# Patient Record
Sex: FEMALE | Race: ASIAN | NOT HISPANIC OR LATINO
[De-identification: names, ages, dates, MRNs, and addresses within clinical notes are randomized per-mention and may not be internally consistent; named-entity substitution may affect disease eponyms.]

---

## 2019-08-22 ENCOUNTER — RESULT REVIEW (OUTPATIENT)
Age: 34
End: 2019-08-22

## 2020-07-31 ENCOUNTER — OUTPATIENT (OUTPATIENT)
Dept: OUTPATIENT SERVICES | Facility: HOSPITAL | Age: 35
LOS: 1 days | Discharge: HOME | End: 2020-07-31
Payer: COMMERCIAL

## 2020-07-31 VITALS
DIASTOLIC BLOOD PRESSURE: 70 MMHG | TEMPERATURE: 99 F | RESPIRATION RATE: 16 BRPM | SYSTOLIC BLOOD PRESSURE: 119 MMHG | HEART RATE: 99 BPM

## 2020-07-31 VITALS — SYSTOLIC BLOOD PRESSURE: 119 MMHG | DIASTOLIC BLOOD PRESSURE: 70 MMHG | HEART RATE: 99 BPM

## 2020-07-31 DIAGNOSIS — Z3A.00 WEEKS OF GESTATION OF PREGNANCY NOT SPECIFIED: ICD-10-CM

## 2020-07-31 DIAGNOSIS — O26.899 OTHER SPECIFIED PREGNANCY RELATED CONDITIONS, UNSPECIFIED TRIMESTER: ICD-10-CM

## 2020-07-31 LAB

## 2020-07-31 PROCEDURE — 99214 OFFICE O/P EST MOD 30 MIN: CPT | Mod: 25

## 2020-07-31 PROCEDURE — 76830 TRANSVAGINAL US NON-OB: CPT | Mod: 26

## 2020-07-31 PROCEDURE — 59025 FETAL NON-STRESS TEST: CPT | Mod: 26

## 2020-07-31 NOTE — OB PROVIDER TRIAGE NOTE - NSHPLABSRESULTS_GEN_ALL_CORE
Urinalysis Basic - ( 2020 16:20 )    Color: LIGHT YELLOW / Appearance: CLEAR / S.008 / pH: 6.5  Gluc: NEGATIVE / Ketone: NEGATIVE  / Bili: NEGATIVE / Urobili: NORMAL   Blood: NEGATIVE / Protein: NEGATIVE / Nitrite: NEGATIVE   Leuk Esterase: NEGATIVE / RBC: x / WBC x   Sq Epi: x / Non Sq Epi: x / Bacteria: x

## 2020-07-31 NOTE — OB PROVIDER TRIAGE NOTE - NSOBPROVIDERNOTE_OBGYN_ALL_OB_FT
34 yo  female  , at 28.6 wks GA with no evidence of PTL or acute pathology at this time  - Muscular skeletal pain in pregnancy (Round Ligament Pain)   - DW DR Rios  - DC Home with detailed precautions and OB F/U as scheduled

## 2020-07-31 NOTE — OB PROVIDER TRIAGE NOTE - NSHPPHYSICALEXAM_GEN_ALL_CORE
A/O x 3  NAD  ICU Vital Signs Last 24 Hrs  T(C): 37 (31 Jul 2020 15:39), Max: 37 (31 Jul 2020 15:39)  T(F): 98.6 (31 Jul 2020 15:39), Max: 98.6 (31 Jul 2020 15:39)  HR: 99 (31 Jul 2020 15:49) (99 - 99)  BP: 119/70 (31 Jul 2020 15:49) (119/70 - 119/70)  BP(mean): --  ABP: --  ABP(mean): --  RR: 16 (31 Jul 2020 15:39) (16 - 16)  SpO2: --  Abdomen is soft NT and gravid with no rebound, no guarding, no rigidity on palpation  SSE: No Pooling, cervix appears visually closed  TVS = 3.7 CM with no dynamic change  FHR: Cat 1 for GA (150 baseline, mod variability, accels, no decels)  TOCO: No CTX

## 2020-10-11 ENCOUNTER — OUTPATIENT (OUTPATIENT)
Dept: INPATIENT UNIT | Facility: HOSPITAL | Age: 35
LOS: 1 days | Discharge: HOME | End: 2020-10-11
Payer: COMMERCIAL

## 2020-10-11 VITALS
HEART RATE: 82 BPM | SYSTOLIC BLOOD PRESSURE: 118 MMHG | TEMPERATURE: 98 F | RESPIRATION RATE: 18 BRPM | DIASTOLIC BLOOD PRESSURE: 75 MMHG

## 2020-10-11 VITALS — HEART RATE: 80 BPM | DIASTOLIC BLOOD PRESSURE: 68 MMHG | SYSTOLIC BLOOD PRESSURE: 119 MMHG

## 2020-10-11 DIAGNOSIS — Z3A.00 WEEKS OF GESTATION OF PREGNANCY NOT SPECIFIED: ICD-10-CM

## 2020-10-11 DIAGNOSIS — O26.899 OTHER SPECIFIED PREGNANCY RELATED CONDITIONS, UNSPECIFIED TRIMESTER: ICD-10-CM

## 2020-10-11 PROCEDURE — 76815 OB US LIMITED FETUS(S): CPT | Mod: 26

## 2020-10-11 PROCEDURE — 99213 OFFICE O/P EST LOW 20 MIN: CPT | Mod: 25

## 2020-10-11 NOTE — OB PROVIDER TRIAGE NOTE - HISTORY OF PRESENT ILLNESS
34yo  @ 39.1 presents with c/o decreased FM since last night. Also reports lower abdominal pressure since 0200. Denies LOF, VB, ctx, urinary symptoms.   Denies exposure to or infection from from COVID-19    Denies PMH/PSH    AP course complicated by:   hypothyroidism with pregnancy- Levothyroxine 25mcg QD

## 2020-10-11 NOTE — OB PROVIDER TRIAGE NOTE - NSOBPROVIDERNOTE_OBGYN_ALL_OB_FT
FH on sono @ 0900 - 135 bpm Plan D/W Dr. Rios, normal fetal testing.   Fetal kick counts reviewed.  Follow up this week as scheduled.    FH on sono @ 0900 - 135 bpm

## 2020-10-11 NOTE — OB PROVIDER TRIAGE NOTE - NSHPPHYSICALEXAM_GEN_ALL_CORE
Assessment reveals VSS, abdomen soft, NT, gravid.    For NST and BPP Assessment reveals VSS, abdomen soft, NT, gravid.    For NST and BPP    Category 1 FHT, occasional ctx on toco  VE 0.5/50/-3  BPP 8/8, KENDALL 9.8, vtx, anterior placenta

## 2020-10-11 NOTE — OB PROVIDER TRIAGE NOTE - NS_DISPOSITION_OBGYN_ALL_OB
Continue to Observe You can access the FollowMyHealth Patient Portal offered by [facility] by registering at the following website: [email or web address]. By joining [facility]’s FollowMyHealth portal, you will also be able to view your health information using other applications (apps) compatible with our system. Discharge

## 2020-10-19 ENCOUNTER — TRANSCRIPTION ENCOUNTER (OUTPATIENT)
Age: 35
End: 2020-10-19

## 2020-10-19 ENCOUNTER — INPATIENT (INPATIENT)
Facility: HOSPITAL | Age: 35
LOS: 3 days | Discharge: HOME | End: 2020-10-23
Attending: SPECIALIST | Admitting: SPECIALIST

## 2020-10-19 VITALS
DIASTOLIC BLOOD PRESSURE: 67 MMHG | HEART RATE: 100 BPM | RESPIRATION RATE: 17 BRPM | SYSTOLIC BLOOD PRESSURE: 137 MMHG | TEMPERATURE: 98 F

## 2020-10-19 DIAGNOSIS — O36.8130 DECREASED FETAL MOVEMENTS, THIRD TRIMESTER, NOT APPLICABLE OR UNSPECIFIED: ICD-10-CM

## 2020-10-19 DIAGNOSIS — O26.899 OTHER SPECIFIED PREGNANCY RELATED CONDITIONS, UNSPECIFIED TRIMESTER: ICD-10-CM

## 2020-10-19 DIAGNOSIS — Z3A.00 WEEKS OF GESTATION OF PREGNANCY NOT SPECIFIED: ICD-10-CM

## 2020-10-19 LAB
APPEARANCE UR: CLEAR — SIGNIFICANT CHANGE UP
BASOPHILS # BLD AUTO: 0.06 K/UL — SIGNIFICANT CHANGE UP (ref 0–0.2)
BASOPHILS NFR BLD AUTO: 0.6 % — SIGNIFICANT CHANGE UP (ref 0–2)
BILIRUB UR-MCNC: NEGATIVE — SIGNIFICANT CHANGE UP
BLOOD UR QL VISUAL: NEGATIVE — SIGNIFICANT CHANGE UP
COLOR SPEC: SIGNIFICANT CHANGE UP
EOSINOPHIL # BLD AUTO: 0.1 K/UL — SIGNIFICANT CHANGE UP (ref 0–0.5)
EOSINOPHIL NFR BLD AUTO: 1.1 % — SIGNIFICANT CHANGE UP (ref 0–6)
GLUCOSE UR-MCNC: NEGATIVE — SIGNIFICANT CHANGE UP
HCT VFR BLD CALC: 42.8 % — SIGNIFICANT CHANGE UP (ref 34.5–45)
HGB BLD-MCNC: 13.7 G/DL — SIGNIFICANT CHANGE UP (ref 11.5–15.5)
IMM GRANULOCYTES NFR BLD AUTO: 0.7 % — SIGNIFICANT CHANGE UP (ref 0–1.5)
KETONES UR-MCNC: NEGATIVE — SIGNIFICANT CHANGE UP
LEUKOCYTE ESTERASE UR-ACNC: NEGATIVE — SIGNIFICANT CHANGE UP
LYMPHOCYTES # BLD AUTO: 3.65 K/UL — HIGH (ref 1–3.3)
LYMPHOCYTES # BLD AUTO: 38.4 % — SIGNIFICANT CHANGE UP (ref 13–44)
MCHC RBC-ENTMCNC: 27.1 PG — SIGNIFICANT CHANGE UP (ref 27–34)
MCHC RBC-ENTMCNC: 32 % — SIGNIFICANT CHANGE UP (ref 32–36)
MCV RBC AUTO: 84.6 FL — SIGNIFICANT CHANGE UP (ref 80–100)
MONOCYTES # BLD AUTO: 0.45 K/UL — SIGNIFICANT CHANGE UP (ref 0–0.9)
MONOCYTES NFR BLD AUTO: 4.7 % — SIGNIFICANT CHANGE UP (ref 2–14)
NEUTROPHILS # BLD AUTO: 5.18 K/UL — SIGNIFICANT CHANGE UP (ref 1.8–7.4)
NEUTROPHILS NFR BLD AUTO: 54.5 % — SIGNIFICANT CHANGE UP (ref 43–77)
NITRITE UR-MCNC: NEGATIVE — SIGNIFICANT CHANGE UP
NRBC # FLD: 0 K/UL — SIGNIFICANT CHANGE UP (ref 0–0)
PH UR: 6 — SIGNIFICANT CHANGE UP (ref 5–8)
PLATELET # BLD AUTO: 209 K/UL — SIGNIFICANT CHANGE UP (ref 150–400)
PMV BLD: 12.5 FL — SIGNIFICANT CHANGE UP (ref 7–13)
PROT UR-MCNC: 10 — SIGNIFICANT CHANGE UP
RBC # BLD: 5.06 M/UL — SIGNIFICANT CHANGE UP (ref 3.8–5.2)
RBC # FLD: 17.9 % — HIGH (ref 10.3–14.5)
SP GR SPEC: 1.01 — SIGNIFICANT CHANGE UP (ref 1–1.04)
UROBILINOGEN FLD QL: NORMAL — SIGNIFICANT CHANGE UP
WBC # BLD: 9.51 K/UL — SIGNIFICANT CHANGE UP (ref 3.8–10.5)
WBC # FLD AUTO: 9.51 K/UL — SIGNIFICANT CHANGE UP (ref 3.8–10.5)

## 2020-10-19 RX ORDER — OXYTOCIN 10 UNIT/ML
333.33 VIAL (ML) INJECTION
Qty: 20 | Refills: 0 | Status: DISCONTINUED | OUTPATIENT
Start: 2020-10-19 | End: 2020-10-20

## 2020-10-19 RX ORDER — SODIUM CHLORIDE 9 MG/ML
1000 INJECTION, SOLUTION INTRAVENOUS
Refills: 0 | Status: DISCONTINUED | OUTPATIENT
Start: 2020-10-19 | End: 2020-10-20

## 2020-10-19 RX ORDER — SODIUM CHLORIDE 9 MG/ML
1000 INJECTION, SOLUTION INTRAVENOUS ONCE
Refills: 0 | Status: COMPLETED | OUTPATIENT
Start: 2020-10-19 | End: 2020-10-20

## 2020-10-19 RX ORDER — LEVOTHYROXINE SODIUM 125 MCG
1 TABLET ORAL
Qty: 0 | Refills: 0 | DISCHARGE

## 2020-10-19 NOTE — OB PROVIDER TRIAGE NOTE - NSOBPROVIDERNOTE_OBGYN_ALL_OB_FT
maternal fetal status reassuring     d/w Dr. Vail     -cleared for discharge home   -keep scheduled appt   -fetal kick count reviewed   -labor precautions reviewed maternal fetal status reassuring but patient reports decreased fetal movement     d/w Dr. Vail    -Admit to L&D for IOL with PO cytotec   -routine orders placed   -covid swab sent

## 2020-10-19 NOTE — OB PROVIDER H&P - NSHPPHYSICALEXAM_GEN_ALL_CORE
Vital Signs Last 24 Hrs  T(C): 36.9 (19 Oct 2020 20:02), Max: 36.9 (19 Oct 2020 20:02)  T(F): 98.4 (19 Oct 2020 20:02), Max: 98.4 (19 Oct 2020 20:02)  HR: 100 (19 Oct 2020 20:02) (100 - 100)  BP: 137/67 (19 Oct 2020 20:02) (137/67 - 137/67)  RR: 17 (19 Oct 2020 20:02) (17 - 17)    Abdomen soft, nontender     Category 1 tracing reviewed by Dr. Doe   No contractions by toco     TAUS Cephalic presentation, anterior placenta, KENDALL 10.11 cm. bpp 10/10    SVE 0.5/60/-3    clinical efw 3500 g

## 2020-10-19 NOTE — OB PROVIDER H&P - ASSESSMENT
maternal fetal status reassuring but patient reports decreased fetal movement     d/w Dr. Vail    -Admit to L&D for IOL with PO cytotec   -routine orders placed   -covid swab sent

## 2020-10-19 NOTE — OB PROVIDER TRIAGE NOTE - ADDITIONAL INSTRUCTIONS
-cleared for discharge home   -keep scheduled appt   -fetal kick count reviewed   -labor precautions reviewed

## 2020-10-19 NOTE — OB PROVIDER TRIAGE NOTE - NSHPPHYSICALEXAM_GEN_ALL_CORE
Vital Signs Last 24 Hrs  T(C): 36.9 (19 Oct 2020 20:02), Max: 36.9 (19 Oct 2020 20:02)  T(F): 98.4 (19 Oct 2020 20:02), Max: 98.4 (19 Oct 2020 20:02)  HR: 100 (19 Oct 2020 20:02) (100 - 100)  BP: 137/67 (19 Oct 2020 20:02) (137/67 - 137/67)  RR: 17 (19 Oct 2020 20:02) (17 - 17)    Abdomen soft, nontender     Category 1 tracing reviewed by Dr. Doe   No contractions by toco     TAUS Cephalic presentation, anterior placenta, KENDALL 10.11 cm. bpp 10/10 Vital Signs Last 24 Hrs  T(C): 36.9 (19 Oct 2020 20:02), Max: 36.9 (19 Oct 2020 20:02)  T(F): 98.4 (19 Oct 2020 20:02), Max: 98.4 (19 Oct 2020 20:02)  HR: 100 (19 Oct 2020 20:02) (100 - 100)  BP: 137/67 (19 Oct 2020 20:02) (137/67 - 137/67)  RR: 17 (19 Oct 2020 20:02) (17 - 17)    Abdomen soft, nontender     Category 1 tracing reviewed by Dr. Doe   No contractions by toco     TAUS Cephalic presentation, anterior placenta, KENDALL 10.11 cm. bpp 10/10    SVE 0.5/60/-3    clinical efw 3500 g

## 2020-10-19 NOTE — OB PROVIDER TRIAGE NOTE - HISTORY OF PRESENT ILLNESS
36 yo  40 2/7 weeks with complaints of decreased fetal movement since 12 noon. Denies contractions, leaking of fluid, or vaginal bleeding.     Current a/p complications: Denies     Allergies: NKDA  Medications: PNV, Synthroid 25 mcg   PMH: Hypothyroidism   PSH: Denies   OB/GYN: Denies   Social: Denies   Psychosocial: Denies

## 2020-10-19 NOTE — OB RN TRIAGE NOTE - HEART RATE (BEATS/MIN)
New Patient Visit Note       Active Problems      1. Pain in both lower legs    2. Varicose veins with complications    3.  Varicose veins with pain        Chief Complaint   Patient presents with:  Varicose Veins: NW PT ref by Dr Walt Fregoso for RT vaicose veins ADULT GUMMIES OR), Take by mouth., Disp: , Rfl:   •  Multiple Vitamins-Minerals (HAIR SKIN AND NAILS FORMULA) Oral Tab, Take by mouth daily.   , Disp: , Rfl:   •  atorvastatin 10 MG Oral Tab, Take 1 tablet (10 mg total) by mouth nightly., Disp: 90 tablet, R rash.   Neurological: Negative for dizziness, syncope and light-headedness. Hematological: Negative for adenopathy. Does not bruise/bleed easily. Psychiatric/Behavioral: Negative for confusion, hallucinations and sleep disturbance.        Physical Findi states that the symptoms have been progressive and have not responded to conservative measures including exercise, weight management and leg elevation. The patient denies any history of superficial thrombophlebitis or DVT.       Plan     The importance o 100

## 2020-10-20 LAB
BLD GP AB SCN SERPL QL: NEGATIVE — SIGNIFICANT CHANGE UP
RH IG SCN BLD-IMP: POSITIVE — SIGNIFICANT CHANGE UP
RH IG SCN BLD-IMP: POSITIVE — SIGNIFICANT CHANGE UP
SARS-COV-2 RNA SPEC QL NAA+PROBE: SIGNIFICANT CHANGE UP
T PALLIDUM AB TITR SER: NEGATIVE — SIGNIFICANT CHANGE UP

## 2020-10-20 RX ORDER — LEVOTHYROXINE SODIUM 125 MCG
25 TABLET ORAL DAILY
Refills: 0 | Status: DISCONTINUED | OUTPATIENT
Start: 2020-10-20 | End: 2020-10-23

## 2020-10-20 RX ORDER — SODIUM CHLORIDE 9 MG/ML
1000 INJECTION, SOLUTION INTRAVENOUS
Refills: 0 | Status: DISCONTINUED | OUTPATIENT
Start: 2020-10-20 | End: 2020-10-21

## 2020-10-20 RX ORDER — INFLUENZA VIRUS VACCINE 15; 15; 15; 15 UG/.5ML; UG/.5ML; UG/.5ML; UG/.5ML
0.5 SUSPENSION INTRAMUSCULAR ONCE
Refills: 0 | Status: COMPLETED | OUTPATIENT
Start: 2020-10-20 | End: 2020-10-22

## 2020-10-20 RX ORDER — OXYTOCIN 10 UNIT/ML
333.33 VIAL (ML) INJECTION
Qty: 20 | Refills: 0 | Status: DISCONTINUED | OUTPATIENT
Start: 2020-10-20 | End: 2020-10-21

## 2020-10-20 RX ADMIN — SODIUM CHLORIDE 1000 MILLILITER(S): 9 INJECTION, SOLUTION INTRAVENOUS at 09:52

## 2020-10-20 RX ADMIN — SODIUM CHLORIDE 125 MILLILITER(S): 9 INJECTION, SOLUTION INTRAVENOUS at 03:42

## 2020-10-20 RX ADMIN — Medication 25 MICROGRAM(S): at 10:12

## 2020-10-20 NOTE — CHART NOTE - NSCHARTNOTEFT_GEN_A_CORE
PGY1 Labor Note    Evaluated patient for cervical change.    SVE 4/80/-3  FHT 140s baseline, mod variability, +accels, -decels  [place] q1-3min    34 y/o G1 at 40w3d IOL for decreased fetal movement on PO cytotec.  - c/w PO cytotec  - epidural top off given    d/w Dr. Rusty Randall PGY1

## 2020-10-20 NOTE — CHART NOTE - NSCHARTNOTEFT_GEN_A_CORE
NP note    Pt seen for increased 8/10 pain, requesting pain management. S/P 3 doses of PO cytotec.     139/69, 67  /minimal to moderate/- accels/- decels  Peggs q2-5min  SVE 1/70/-3    Tracing non reactive; ineligible for Stadol IVP at this time; pt opting for epidural    -Anesthesia paged for epidural  -Continue PO cytotec  -D/W Dr. Rusty colby, NP

## 2020-10-20 NOTE — CHART NOTE - NSCHARTNOTEFT_GEN_A_CORE
NP note    Pt seen for cervical evaluation. SROM clear fluid 150pm    T(C): 36.7 (20 Oct 2020 14:06), Max: 37.0 (20 Oct 2020 02:36)  T(F): 98.1 (20 Oct 2020 14:06), Max: 98.6 (20 Oct 2020 02:36)  HR: 72 (20 Oct 2020 14:33) (56 - 100)  BP: 112/64 (20 Oct 2020 14:25) (73/35 - 151/67)  RR: 18 (20 Oct 2020 01:01) (17 - 18)  SpO2: 97% (20 Oct 2020 14:33) (89% - 100%)  /moderate variability/+ accels/no decels  [place] q2-5min  SVE 3-4/80/-3    Continue PO cytotec as per Dr. Rusty colby, NP

## 2020-10-21 ENCOUNTER — TRANSCRIPTION ENCOUNTER (OUTPATIENT)
Age: 35
End: 2020-10-21

## 2020-10-21 ENCOUNTER — APPOINTMENT (OUTPATIENT)
Dept: DISASTER EMERGENCY | Facility: CLINIC | Age: 35
End: 2020-10-21

## 2020-10-21 LAB
ALBUMIN SERPL ELPH-MCNC: 2.5 G/DL — LOW (ref 3.3–5)
ALBUMIN SERPL ELPH-MCNC: 2.5 G/DL — LOW (ref 3.3–5)
ALP SERPL-CCNC: 223 U/L — HIGH (ref 40–120)
ALP SERPL-CCNC: 252 U/L — HIGH (ref 40–120)
ALT FLD-CCNC: 47 U/L — HIGH (ref 4–33)
ALT FLD-CCNC: 49 U/L — HIGH (ref 4–33)
ANION GAP SERPL CALC-SCNC: 11 MMO/L — SIGNIFICANT CHANGE UP (ref 7–14)
ANION GAP SERPL CALC-SCNC: 14 MMO/L — SIGNIFICANT CHANGE UP (ref 7–14)
APTT BLD: 28.4 SEC — SIGNIFICANT CHANGE UP (ref 27–36.3)
APTT BLD: 28.9 SEC — SIGNIFICANT CHANGE UP (ref 27–36.3)
AST SERPL-CCNC: 45 U/L — HIGH (ref 4–32)
AST SERPL-CCNC: 46 U/L — HIGH (ref 4–32)
BASOPHILS # BLD AUTO: 0.03 K/UL — SIGNIFICANT CHANGE UP (ref 0–0.2)
BASOPHILS # BLD AUTO: 0.03 K/UL — SIGNIFICANT CHANGE UP (ref 0–0.2)
BASOPHILS NFR BLD AUTO: 0.2 % — SIGNIFICANT CHANGE UP (ref 0–2)
BASOPHILS NFR BLD AUTO: 0.2 % — SIGNIFICANT CHANGE UP (ref 0–2)
BILIRUB SERPL-MCNC: 0.3 MG/DL — SIGNIFICANT CHANGE UP (ref 0.2–1.2)
BILIRUB SERPL-MCNC: 0.4 MG/DL — SIGNIFICANT CHANGE UP (ref 0.2–1.2)
BUN SERPL-MCNC: 20 MG/DL — SIGNIFICANT CHANGE UP (ref 7–23)
BUN SERPL-MCNC: 21 MG/DL — SIGNIFICANT CHANGE UP (ref 7–23)
CALCIUM SERPL-MCNC: 8.2 MG/DL — LOW (ref 8.4–10.5)
CALCIUM SERPL-MCNC: 8.7 MG/DL — SIGNIFICANT CHANGE UP (ref 8.4–10.5)
CHLORIDE SERPL-SCNC: 102 MMOL/L — SIGNIFICANT CHANGE UP (ref 98–107)
CHLORIDE SERPL-SCNC: 99 MMOL/L — SIGNIFICANT CHANGE UP (ref 98–107)
CO2 SERPL-SCNC: 17 MMOL/L — LOW (ref 22–31)
CO2 SERPL-SCNC: 20 MMOL/L — LOW (ref 22–31)
CREAT SERPL-MCNC: 1.64 MG/DL — HIGH (ref 0.5–1.3)
CREAT SERPL-MCNC: 1.76 MG/DL — HIGH (ref 0.5–1.3)
CULTURE RESULTS: SIGNIFICANT CHANGE UP
EOSINOPHIL # BLD AUTO: 0.02 K/UL — SIGNIFICANT CHANGE UP (ref 0–0.5)
EOSINOPHIL # BLD AUTO: 0.02 K/UL — SIGNIFICANT CHANGE UP (ref 0–0.5)
EOSINOPHIL NFR BLD AUTO: 0.1 % — SIGNIFICANT CHANGE UP (ref 0–6)
EOSINOPHIL NFR BLD AUTO: 0.1 % — SIGNIFICANT CHANGE UP (ref 0–6)
FIBRINOGEN PPP-MCNC: 652 MG/DL — HIGH (ref 290–520)
FIBRINOGEN PPP-MCNC: 721 MG/DL — HIGH (ref 290–520)
GLUCOSE SERPL-MCNC: 112 MG/DL — HIGH (ref 70–99)
GLUCOSE SERPL-MCNC: 116 MG/DL — HIGH (ref 70–99)
HCT VFR BLD CALC: 33.5 % — LOW (ref 34.5–45)
HCT VFR BLD CALC: 36.9 % — SIGNIFICANT CHANGE UP (ref 34.5–45)
HGB BLD-MCNC: 10.7 G/DL — LOW (ref 11.5–15.5)
HGB BLD-MCNC: 11.8 G/DL — SIGNIFICANT CHANGE UP (ref 11.5–15.5)
IMM GRANULOCYTES NFR BLD AUTO: 1.4 % — SIGNIFICANT CHANGE UP (ref 0–1.5)
IMM GRANULOCYTES NFR BLD AUTO: 2.3 % — HIGH (ref 0–1.5)
INR BLD: 0.91 — SIGNIFICANT CHANGE UP (ref 0.88–1.16)
INR BLD: 0.96 — SIGNIFICANT CHANGE UP (ref 0.88–1.16)
LDH SERPL L TO P-CCNC: 278 U/L — HIGH (ref 135–225)
LDH SERPL L TO P-CCNC: 326 U/L — HIGH (ref 135–225)
LYMPHOCYTES # BLD AUTO: 1.94 K/UL — SIGNIFICANT CHANGE UP (ref 1–3.3)
LYMPHOCYTES # BLD AUTO: 12.5 % — LOW (ref 13–44)
LYMPHOCYTES # BLD AUTO: 15.4 % — SIGNIFICANT CHANGE UP (ref 13–44)
LYMPHOCYTES # BLD AUTO: 2.82 K/UL — SIGNIFICANT CHANGE UP (ref 1–3.3)
MAGNESIUM SERPL-MCNC: 5.3 MG/DL — HIGH (ref 1.6–2.6)
MCHC RBC-ENTMCNC: 27.4 PG — SIGNIFICANT CHANGE UP (ref 27–34)
MCHC RBC-ENTMCNC: 27.8 PG — SIGNIFICANT CHANGE UP (ref 27–34)
MCHC RBC-ENTMCNC: 31.9 % — LOW (ref 32–36)
MCHC RBC-ENTMCNC: 32 % — SIGNIFICANT CHANGE UP (ref 32–36)
MCV RBC AUTO: 85.9 FL — SIGNIFICANT CHANGE UP (ref 80–100)
MCV RBC AUTO: 87 FL — SIGNIFICANT CHANGE UP (ref 80–100)
MONOCYTES # BLD AUTO: 0.83 K/UL — SIGNIFICANT CHANGE UP (ref 0–0.9)
MONOCYTES # BLD AUTO: 0.92 K/UL — HIGH (ref 0–0.9)
MONOCYTES NFR BLD AUTO: 5 % — SIGNIFICANT CHANGE UP (ref 2–14)
MONOCYTES NFR BLD AUTO: 5.4 % — SIGNIFICANT CHANGE UP (ref 2–14)
NEUTROPHILS # BLD AUTO: 12.32 K/UL — HIGH (ref 1.8–7.4)
NEUTROPHILS # BLD AUTO: 14.26 K/UL — HIGH (ref 1.8–7.4)
NEUTROPHILS NFR BLD AUTO: 77.9 % — HIGH (ref 43–77)
NEUTROPHILS NFR BLD AUTO: 79.5 % — HIGH (ref 43–77)
NRBC # FLD: 0 K/UL — SIGNIFICANT CHANGE UP (ref 0–0)
NRBC # FLD: 0.02 K/UL — SIGNIFICANT CHANGE UP (ref 0–0)
PLATELET # BLD AUTO: 150 K/UL — SIGNIFICANT CHANGE UP (ref 150–400)
PLATELET # BLD AUTO: 158 K/UL — SIGNIFICANT CHANGE UP (ref 150–400)
PMV BLD: 12.9 FL — SIGNIFICANT CHANGE UP (ref 7–13)
PMV BLD: 12.9 FL — SIGNIFICANT CHANGE UP (ref 7–13)
POTASSIUM SERPL-MCNC: 4.3 MMOL/L — SIGNIFICANT CHANGE UP (ref 3.5–5.3)
POTASSIUM SERPL-MCNC: 4.7 MMOL/L — SIGNIFICANT CHANGE UP (ref 3.5–5.3)
POTASSIUM SERPL-SCNC: 4.3 MMOL/L — SIGNIFICANT CHANGE UP (ref 3.5–5.3)
POTASSIUM SERPL-SCNC: 4.7 MMOL/L — SIGNIFICANT CHANGE UP (ref 3.5–5.3)
PROT SERPL-MCNC: 5.2 G/DL — LOW (ref 6–8.3)
PROT SERPL-MCNC: 5.9 G/DL — LOW (ref 6–8.3)
PROTHROM AB SERPL-ACNC: 10.4 SEC — LOW (ref 10.6–13.6)
PROTHROM AB SERPL-ACNC: 11.1 SEC — SIGNIFICANT CHANGE UP (ref 10.6–13.6)
RBC # BLD: 3.9 M/UL — SIGNIFICANT CHANGE UP (ref 3.8–5.2)
RBC # BLD: 4.24 M/UL — SIGNIFICANT CHANGE UP (ref 3.8–5.2)
RBC # FLD: 17.6 % — HIGH (ref 10.3–14.5)
RBC # FLD: 17.8 % — HIGH (ref 10.3–14.5)
SODIUM SERPL-SCNC: 130 MMOL/L — LOW (ref 135–145)
SODIUM SERPL-SCNC: 133 MMOL/L — LOW (ref 135–145)
SPECIMEN SOURCE: SIGNIFICANT CHANGE UP
URATE SERPL-MCNC: 7.6 MG/DL — HIGH (ref 2.5–7)
URATE SERPL-MCNC: 7.7 MG/DL — HIGH (ref 2.5–7)
WBC # BLD: 15.5 K/UL — HIGH (ref 3.8–10.5)
WBC # BLD: 18.31 K/UL — HIGH (ref 3.8–10.5)
WBC # FLD AUTO: 15.5 K/UL — HIGH (ref 3.8–10.5)
WBC # FLD AUTO: 18.31 K/UL — HIGH (ref 3.8–10.5)

## 2020-10-21 RX ORDER — AER TRAVELER 0.5 G/1
1 SOLUTION RECTAL; TOPICAL EVERY 4 HOURS
Refills: 0 | Status: DISCONTINUED | OUTPATIENT
Start: 2020-10-21 | End: 2020-10-23

## 2020-10-21 RX ORDER — SIMETHICONE 80 MG/1
80 TABLET, CHEWABLE ORAL EVERY 4 HOURS
Refills: 0 | Status: DISCONTINUED | OUTPATIENT
Start: 2020-10-21 | End: 2020-10-23

## 2020-10-21 RX ORDER — IBUPROFEN 200 MG
600 TABLET ORAL EVERY 6 HOURS
Refills: 0 | Status: DISCONTINUED | OUTPATIENT
Start: 2020-10-21 | End: 2020-10-23

## 2020-10-21 RX ORDER — ACETAMINOPHEN 500 MG
975 TABLET ORAL
Refills: 0 | Status: DISCONTINUED | OUTPATIENT
Start: 2020-10-21 | End: 2020-10-23

## 2020-10-21 RX ORDER — TETANUS TOXOID, REDUCED DIPHTHERIA TOXOID AND ACELLULAR PERTUSSIS VACCINE, ADSORBED 5; 2.5; 8; 8; 2.5 [IU]/.5ML; [IU]/.5ML; UG/.5ML; UG/.5ML; UG/.5ML
0.5 SUSPENSION INTRAMUSCULAR ONCE
Refills: 0 | Status: DISCONTINUED | OUTPATIENT
Start: 2020-10-21 | End: 2020-10-23

## 2020-10-21 RX ORDER — LEVOTHYROXINE SODIUM 125 MCG
1 TABLET ORAL
Qty: 0 | Refills: 0 | DISCHARGE

## 2020-10-21 RX ORDER — LANOLIN
1 OINTMENT (GRAM) TOPICAL EVERY 6 HOURS
Refills: 0 | Status: DISCONTINUED | OUTPATIENT
Start: 2020-10-21 | End: 2020-10-23

## 2020-10-21 RX ORDER — MAGNESIUM SULFATE 500 MG/ML
1 VIAL (ML) INJECTION
Qty: 40 | Refills: 0 | Status: DISCONTINUED | OUTPATIENT
Start: 2020-10-21 | End: 2020-10-22

## 2020-10-21 RX ORDER — PRAMOXINE HYDROCHLORIDE 150 MG/15G
1 AEROSOL, FOAM RECTAL EVERY 4 HOURS
Refills: 0 | Status: DISCONTINUED | OUTPATIENT
Start: 2020-10-21 | End: 2020-10-23

## 2020-10-21 RX ORDER — SODIUM CHLORIDE 9 MG/ML
3 INJECTION INTRAMUSCULAR; INTRAVENOUS; SUBCUTANEOUS EVERY 8 HOURS
Refills: 0 | Status: DISCONTINUED | OUTPATIENT
Start: 2020-10-21 | End: 2020-10-23

## 2020-10-21 RX ORDER — BENZOCAINE 10 %
1 GEL (GRAM) MUCOUS MEMBRANE EVERY 6 HOURS
Refills: 0 | Status: DISCONTINUED | OUTPATIENT
Start: 2020-10-21 | End: 2020-10-23

## 2020-10-21 RX ORDER — KETOROLAC TROMETHAMINE 30 MG/ML
30 SYRINGE (ML) INJECTION ONCE
Refills: 0 | Status: DISCONTINUED | OUTPATIENT
Start: 2020-10-21 | End: 2020-10-21

## 2020-10-21 RX ORDER — SODIUM CHLORIDE 9 MG/ML
1000 INJECTION, SOLUTION INTRAVENOUS
Refills: 0 | Status: DISCONTINUED | OUTPATIENT
Start: 2020-10-21 | End: 2020-10-22

## 2020-10-21 RX ORDER — MAGNESIUM SULFATE 500 MG/ML
2 VIAL (ML) INJECTION
Qty: 40 | Refills: 0 | Status: DISCONTINUED | OUTPATIENT
Start: 2020-10-21 | End: 2020-10-21

## 2020-10-21 RX ORDER — HYDROCORTISONE 1 %
1 OINTMENT (GRAM) TOPICAL EVERY 6 HOURS
Refills: 0 | Status: DISCONTINUED | OUTPATIENT
Start: 2020-10-21 | End: 2020-10-23

## 2020-10-21 RX ORDER — DIPHENHYDRAMINE HCL 50 MG
25 CAPSULE ORAL EVERY 6 HOURS
Refills: 0 | Status: DISCONTINUED | OUTPATIENT
Start: 2020-10-21 | End: 2020-10-23

## 2020-10-21 RX ORDER — OXYCODONE HYDROCHLORIDE 5 MG/1
5 TABLET ORAL ONCE
Refills: 0 | Status: DISCONTINUED | OUTPATIENT
Start: 2020-10-21 | End: 2020-10-23

## 2020-10-21 RX ORDER — MAGNESIUM SULFATE 500 MG/ML
4 VIAL (ML) INJECTION ONCE
Refills: 0 | Status: COMPLETED | OUTPATIENT
Start: 2020-10-21 | End: 2020-10-21

## 2020-10-21 RX ORDER — OXYCODONE HYDROCHLORIDE 5 MG/1
5 TABLET ORAL
Refills: 0 | Status: DISCONTINUED | OUTPATIENT
Start: 2020-10-21 | End: 2020-10-23

## 2020-10-21 RX ORDER — MAGNESIUM HYDROXIDE 400 MG/1
30 TABLET, CHEWABLE ORAL
Refills: 0 | Status: DISCONTINUED | OUTPATIENT
Start: 2020-10-21 | End: 2020-10-23

## 2020-10-21 RX ORDER — DIBUCAINE 1 %
1 OINTMENT (GRAM) RECTAL EVERY 6 HOURS
Refills: 0 | Status: DISCONTINUED | OUTPATIENT
Start: 2020-10-21 | End: 2020-10-23

## 2020-10-21 RX ORDER — IBUPROFEN 200 MG
1 TABLET ORAL
Qty: 0 | Refills: 0 | DISCHARGE
Start: 2020-10-21

## 2020-10-21 RX ORDER — ACETAMINOPHEN 500 MG
3 TABLET ORAL
Qty: 0 | Refills: 0 | DISCHARGE
Start: 2020-10-21

## 2020-10-21 RX ORDER — OXYTOCIN 10 UNIT/ML
333.33 VIAL (ML) INJECTION
Qty: 20 | Refills: 0 | Status: DISCONTINUED | OUTPATIENT
Start: 2020-10-21 | End: 2020-10-21

## 2020-10-21 RX ORDER — IBUPROFEN 200 MG
600 TABLET ORAL EVERY 6 HOURS
Refills: 0 | Status: COMPLETED | OUTPATIENT
Start: 2020-10-21 | End: 2021-09-19

## 2020-10-21 RX ORDER — OXYTOCIN 10 UNIT/ML
2 VIAL (ML) INJECTION
Qty: 30 | Refills: 0 | Status: DISCONTINUED | OUTPATIENT
Start: 2020-10-21 | End: 2020-10-21

## 2020-10-21 RX ADMIN — AER TRAVELER 1 APPLICATION(S): 0.5 SOLUTION RECTAL; TOPICAL at 15:03

## 2020-10-21 RX ADMIN — Medication 50 GM/HR: at 14:41

## 2020-10-21 RX ADMIN — Medication 975 MILLIGRAM(S): at 15:03

## 2020-10-21 RX ADMIN — PRAMOXINE HYDROCHLORIDE 1 APPLICATION(S): 150 AEROSOL, FOAM RECTAL at 15:03

## 2020-10-21 RX ADMIN — Medication 200 GRAM(S): at 12:26

## 2020-10-21 RX ADMIN — Medication 25 GM/HR: at 19:20

## 2020-10-21 RX ADMIN — Medication 1 TABLET(S): at 15:03

## 2020-10-21 RX ADMIN — Medication 975 MILLIGRAM(S): at 22:54

## 2020-10-21 RX ADMIN — Medication 30 MILLIGRAM(S): at 09:14

## 2020-10-21 RX ADMIN — Medication 1 SPRAY(S): at 15:03

## 2020-10-21 RX ADMIN — Medication 2 MILLIUNIT(S)/MIN: at 07:23

## 2020-10-21 RX ADMIN — Medication 975 MILLIGRAM(S): at 15:55

## 2020-10-21 RX ADMIN — SODIUM CHLORIDE 3 MILLILITER(S): 9 INJECTION INTRAMUSCULAR; INTRAVENOUS; SUBCUTANEOUS at 14:50

## 2020-10-21 RX ADMIN — SODIUM CHLORIDE 3 MILLILITER(S): 9 INJECTION INTRAMUSCULAR; INTRAVENOUS; SUBCUTANEOUS at 22:00

## 2020-10-21 RX ADMIN — Medication 1 APPLICATION(S): at 15:03

## 2020-10-21 RX ADMIN — Medication 50 GM/HR: at 12:55

## 2020-10-21 RX ADMIN — Medication 1000 MILLIUNIT(S)/MIN: at 08:25

## 2020-10-21 RX ADMIN — Medication 30 MILLIGRAM(S): at 09:15

## 2020-10-21 NOTE — DISCHARGE NOTE OB - PATIENT PORTAL LINK FT
You can access the FollowMyHealth Patient Portal offered by [facility] by registering at the following website: [email or web address]. By joining [facility]’s FollowMyHealth portal, you will also be able to view your health information using other applications (apps) compatible with our system.

## 2020-10-21 NOTE — CHART NOTE - NSCHARTNOTEFT_GEN_A_CORE
PA Note    Patient pp day 0 s/p uncomplicated . Notified by RN 2/2 mild range BPs s/p delivery. Upon review of BP overnight patient had isolated mild range BPs 140's/80's. No severe range BPs were noted. Patient asymptomatic, denies HA, visual changes, N/V, epigastric pain.     VS  T(C): 36.6 (10-21-20 @ 08:22)  HR: 99 (10-21-20 @ 10:49)  BP: 130/61 (10-21-20 @ 10:49)  RR: 17 (10-21-20 @ 09:20)  SpO2: 87% (10-21-20 @ 10:49)    NAD, comfortable  abd soft fundus firm    LABS:                        11.8   15.50 )-----------( 158      ( 21 Oct 2020 09:30 )             36.9     21 Oct 2020 09:30    130    |  99     |  20     ----------------------------<  112    4.7     |  17     |  1.76     Ca    8.7        21 Oct 2020 09:30    TPro  5.9    /  Alb  2.5    /  TBili  0.4    /  DBili  x      /  AST  45     /  ALT  49     /  AlkPhos  252    21 Oct 2020 09:30    PT/INR - ( 21 Oct 2020 09:30 )   PT: 11.1 SEC;   INR: 0.96          PTT - ( 21 Oct 2020 09:30 )  PTT:28.9 SEC      Plan  - upon review of HELLP labs patient with mildly elevated LFTs & Cr 1.76   - last Cr noted '16 0.89  - given lab values & mild range BPs will start Mg for severe PEC  - repeat HELLP labs q6hrs  - above was dw Dr Peck & PGY4 Jannie silva

## 2020-10-21 NOTE — CHART NOTE - NSCHARTNOTEFT_GEN_A_CORE
PGY1 Labor Note    Evaluated patient for cervical change. Patient feeling increased pressure    VS  T(C): 36.7 (10-21-20 @ 02:45)  HR: 83 (10-21-20 @ 05:28)  BP: 113/74 (10-21-20 @ 05:20)  RR: 17 (10-21-20 @ 02:45)  SpO2: 92% (10-21-20 @ 05:28)    SVE 9/90/+1  FHT 140s baseline, mod variability, +accels, -decels  [place]erman q1-2min    34 y/o G1 at 40w3d IOL for decreased fetal movement on pitocin s/p PO cytotec  - continue pitocin  - anticipate     Haley Randall PGY1.

## 2020-10-21 NOTE — DISCHARGE NOTE OB - CARE PLAN
Principal Discharge DX:	 (normal spontaneous vaginal delivery)  Goal:	home with self care  Assessment and plan of treatment:	return 6 weeks

## 2020-10-21 NOTE — OB RN DELIVERY SUMMARY - NS_SEPSISRSKCALC_OBGYN_ALL_OB_FT
EOS calculated successfully. EOS Risk Factor: 0.5/1000 live births ([facility] national incidence); GA=40w4d; Temp=98.6; ROM=18.117; GBS='Negative'; Antibiotics='No antibiotics or any antibiotics < 2 hrs prior to birth'

## 2020-10-21 NOTE — DISCHARGE NOTE OB - MEDICATION SUMMARY - MEDICATIONS TO TAKE
I will START or STAY ON the medications listed below when I get home from the hospital:    acetaminophen 325 mg oral tablet  -- 3 tab(s) by mouth   -- Indication: For pain    ibuprofen 600 mg oral tablet  -- 1 tab(s) by mouth every 6 hours  -- Indication: For pain    PNV Prenatal oral tablet  -- 1 tab(s) by mouth once a day  -- Indication: For vitamins

## 2020-10-21 NOTE — DISCHARGE NOTE OB - CARE PROVIDER_API CALL
Aguilar Rios)  Obstetrics and Gynecology  [address] [id number]  Phone: [phone]  Fax: [phone]  Follow Up Time:

## 2020-10-21 NOTE — OB PROVIDER DELIVERY SUMMARY - NSPROVIDERDELIVERYNOTE_OBGYN_ALL_OB_FT
Patient had an  of a viable male infant from OA presentation. Head, shoulders & body delivered easily. Infant handed to mother. Cord clamped x 2 & cut. Placenta delivered intact via gentle uterine massage. 1st degree and davis-urethral lacerations repaired in the usual fashion. Excellent hemostasis noted. Fundus firm.      Attending Dr Rios  Assistant HERVE Mclean

## 2020-10-21 NOTE — CHART NOTE - NSCHARTNOTEFT_GEN_A_CORE
R3 Progress Note:    Patient seen and examined at bedside for c/o pressure.    NAD  Vital Signs Last 24 Hrs  T(C): 36.7 (21 Oct 2020 02:45), Max: 36.9 (20 Oct 2020 16:01)  T(F): 98.06 (21 Oct 2020 02:45), Max: 98.42 (20 Oct 2020 17:59)  HR: 79 (21 Oct 2020 03:48) (56 - 96)  BP: 138/73 (21 Oct 2020 03:40) (73/35 - 153/79)  BP(mean): --  RR: 17 (21 Oct 2020 02:45) (17 - 18)  SpO2: 97% (21 Oct 2020 03:48) (62% - 100%)    SVE - 9.5/90/+1  EFM - 130/mod/+accels/-deccels  TOCO - ctx q2-3 mins    A/P 35y  @ 40+4wks GA, admitted for IOL for decreased fetal movement. Patient is status post PO cytotec.      - labor: made change to 9-10cm. D/c PO. Nikki frequently. Will re-assess in 1-2 hours; if no change, will start pitocin  - fetus: cat 1 FHT  - gbs: neg  - pain: epidural in situ, no complaints    Patient to be moved to the main LDR suite     Mariya Whiting PGY-3

## 2020-10-22 LAB
ALBUMIN SERPL ELPH-MCNC: 2.2 G/DL — LOW (ref 3.3–5)
ALBUMIN SERPL ELPH-MCNC: 2.6 G/DL — LOW (ref 3.3–5)
ALP SERPL-CCNC: 207 U/L — HIGH (ref 40–120)
ALP SERPL-CCNC: 210 U/L — HIGH (ref 40–120)
ALT FLD-CCNC: 35 U/L — HIGH (ref 4–33)
ALT FLD-CCNC: 40 U/L — HIGH (ref 4–33)
ANION GAP SERPL CALC-SCNC: 10 MMO/L — SIGNIFICANT CHANGE UP (ref 7–14)
ANION GAP SERPL CALC-SCNC: 9 MMO/L — SIGNIFICANT CHANGE UP (ref 7–14)
APTT BLD: 27.5 SEC — SIGNIFICANT CHANGE UP (ref 27–36.3)
APTT BLD: 30.1 SEC — SIGNIFICANT CHANGE UP (ref 27–36.3)
AST SERPL-CCNC: 29 U/L — SIGNIFICANT CHANGE UP (ref 4–32)
AST SERPL-CCNC: 32 U/L — SIGNIFICANT CHANGE UP (ref 4–32)
BASOPHILS # BLD AUTO: 0.05 K/UL — SIGNIFICANT CHANGE UP (ref 0–0.2)
BASOPHILS # BLD AUTO: 0.05 K/UL — SIGNIFICANT CHANGE UP (ref 0–0.2)
BASOPHILS NFR BLD AUTO: 0.3 % — SIGNIFICANT CHANGE UP (ref 0–2)
BASOPHILS NFR BLD AUTO: 0.4 % — SIGNIFICANT CHANGE UP (ref 0–2)
BILIRUB SERPL-MCNC: < 0.2 MG/DL — LOW (ref 0.2–1.2)
BILIRUB SERPL-MCNC: < 0.2 MG/DL — LOW (ref 0.2–1.2)
BUN SERPL-MCNC: 18 MG/DL — SIGNIFICANT CHANGE UP (ref 7–23)
BUN SERPL-MCNC: 18 MG/DL — SIGNIFICANT CHANGE UP (ref 7–23)
CALCIUM SERPL-MCNC: 7.6 MG/DL — LOW (ref 8.4–10.5)
CALCIUM SERPL-MCNC: 7.9 MG/DL — LOW (ref 8.4–10.5)
CHLORIDE SERPL-SCNC: 103 MMOL/L — SIGNIFICANT CHANGE UP (ref 98–107)
CHLORIDE SERPL-SCNC: 106 MMOL/L — SIGNIFICANT CHANGE UP (ref 98–107)
CO2 SERPL-SCNC: 22 MMOL/L — SIGNIFICANT CHANGE UP (ref 22–31)
CO2 SERPL-SCNC: 22 MMOL/L — SIGNIFICANT CHANGE UP (ref 22–31)
CREAT SERPL-MCNC: 1.47 MG/DL — HIGH (ref 0.5–1.3)
CREAT SERPL-MCNC: 1.55 MG/DL — HIGH (ref 0.5–1.3)
EOSINOPHIL # BLD AUTO: 0.08 K/UL — SIGNIFICANT CHANGE UP (ref 0–0.5)
EOSINOPHIL # BLD AUTO: 0.12 K/UL — SIGNIFICANT CHANGE UP (ref 0–0.5)
EOSINOPHIL NFR BLD AUTO: 0.6 % — SIGNIFICANT CHANGE UP (ref 0–6)
EOSINOPHIL NFR BLD AUTO: 0.8 % — SIGNIFICANT CHANGE UP (ref 0–6)
FIBRINOGEN PPP-MCNC: 668 MG/DL — HIGH (ref 290–520)
FIBRINOGEN PPP-MCNC: 704 MG/DL — HIGH (ref 290–520)
GLUCOSE SERPL-MCNC: 80 MG/DL — SIGNIFICANT CHANGE UP (ref 70–99)
GLUCOSE SERPL-MCNC: 94 MG/DL — SIGNIFICANT CHANGE UP (ref 70–99)
HCT VFR BLD CALC: 32.4 % — LOW (ref 34.5–45)
HCT VFR BLD CALC: 34.6 % — SIGNIFICANT CHANGE UP (ref 34.5–45)
HGB BLD-MCNC: 10.3 G/DL — LOW (ref 11.5–15.5)
HGB BLD-MCNC: 11.1 G/DL — LOW (ref 11.5–15.5)
IMM GRANULOCYTES NFR BLD AUTO: 0.8 % — SIGNIFICANT CHANGE UP (ref 0–1.5)
IMM GRANULOCYTES NFR BLD AUTO: 0.8 % — SIGNIFICANT CHANGE UP (ref 0–1.5)
INR BLD: 0.85 — LOW (ref 0.88–1.16)
INR BLD: 0.86 — LOW (ref 0.88–1.16)
LDH SERPL L TO P-CCNC: 261 U/L — HIGH (ref 135–225)
LDH SERPL L TO P-CCNC: 290 U/L — HIGH (ref 135–225)
LYMPHOCYTES # BLD AUTO: 24.9 % — SIGNIFICANT CHANGE UP (ref 13–44)
LYMPHOCYTES # BLD AUTO: 26.1 % — SIGNIFICANT CHANGE UP (ref 13–44)
LYMPHOCYTES # BLD AUTO: 3.25 K/UL — SIGNIFICANT CHANGE UP (ref 1–3.3)
LYMPHOCYTES # BLD AUTO: 3.85 K/UL — HIGH (ref 1–3.3)
MAGNESIUM SERPL-MCNC: 6.4 MG/DL — HIGH (ref 1.6–2.6)
MAGNESIUM SERPL-MCNC: 6.4 MG/DL — HIGH (ref 1.6–2.6)
MCHC RBC-ENTMCNC: 27.6 PG — SIGNIFICANT CHANGE UP (ref 27–34)
MCHC RBC-ENTMCNC: 27.6 PG — SIGNIFICANT CHANGE UP (ref 27–34)
MCHC RBC-ENTMCNC: 31.8 % — LOW (ref 32–36)
MCHC RBC-ENTMCNC: 32.1 % — SIGNIFICANT CHANGE UP (ref 32–36)
MCV RBC AUTO: 86.1 FL — SIGNIFICANT CHANGE UP (ref 80–100)
MCV RBC AUTO: 86.9 FL — SIGNIFICANT CHANGE UP (ref 80–100)
MONOCYTES # BLD AUTO: 0.64 K/UL — SIGNIFICANT CHANGE UP (ref 0–0.9)
MONOCYTES # BLD AUTO: 0.77 K/UL — SIGNIFICANT CHANGE UP (ref 0–0.9)
MONOCYTES NFR BLD AUTO: 5 % — SIGNIFICANT CHANGE UP (ref 2–14)
MONOCYTES NFR BLD AUTO: 5.1 % — SIGNIFICANT CHANGE UP (ref 2–14)
NEUTROPHILS # BLD AUTO: 10.56 K/UL — HIGH (ref 1.8–7.4)
NEUTROPHILS # BLD AUTO: 8.32 K/UL — HIGH (ref 1.8–7.4)
NEUTROPHILS NFR BLD AUTO: 67 % — SIGNIFICANT CHANGE UP (ref 43–77)
NEUTROPHILS NFR BLD AUTO: 68.2 % — SIGNIFICANT CHANGE UP (ref 43–77)
NRBC # FLD: 0 K/UL — SIGNIFICANT CHANGE UP (ref 0–0)
NRBC # FLD: 0 K/UL — SIGNIFICANT CHANGE UP (ref 0–0)
PLATELET # BLD AUTO: 169 K/UL — SIGNIFICANT CHANGE UP (ref 150–400)
PLATELET # BLD AUTO: 183 K/UL — SIGNIFICANT CHANGE UP (ref 150–400)
PMV BLD: 12.3 FL — SIGNIFICANT CHANGE UP (ref 7–13)
PMV BLD: 12.3 FL — SIGNIFICANT CHANGE UP (ref 7–13)
POTASSIUM SERPL-MCNC: 3.7 MMOL/L — SIGNIFICANT CHANGE UP (ref 3.5–5.3)
POTASSIUM SERPL-MCNC: 4 MMOL/L — SIGNIFICANT CHANGE UP (ref 3.5–5.3)
POTASSIUM SERPL-SCNC: 3.7 MMOL/L — SIGNIFICANT CHANGE UP (ref 3.5–5.3)
POTASSIUM SERPL-SCNC: 4 MMOL/L — SIGNIFICANT CHANGE UP (ref 3.5–5.3)
PROT SERPL-MCNC: 5.5 G/DL — LOW (ref 6–8.3)
PROT SERPL-MCNC: 5.6 G/DL — LOW (ref 6–8.3)
PROTHROM AB SERPL-ACNC: 9.8 SEC — LOW (ref 10.6–13.6)
PROTHROM AB SERPL-ACNC: 9.9 SEC — LOW (ref 10.6–13.6)
RBC # BLD: 3.73 M/UL — LOW (ref 3.8–5.2)
RBC # BLD: 4.02 M/UL — SIGNIFICANT CHANGE UP (ref 3.8–5.2)
RBC # FLD: 17.4 % — HIGH (ref 10.3–14.5)
RBC # FLD: 18 % — HIGH (ref 10.3–14.5)
SODIUM SERPL-SCNC: 134 MMOL/L — LOW (ref 135–145)
SODIUM SERPL-SCNC: 138 MMOL/L — SIGNIFICANT CHANGE UP (ref 135–145)
URATE SERPL-MCNC: 7.8 MG/DL — HIGH (ref 2.5–7)
URATE SERPL-MCNC: 8 MG/DL — HIGH (ref 2.5–7)
URATE SERPL-MCNC: 8.1 MG/DL — HIGH (ref 2.5–7)
WBC # BLD: 12.44 K/UL — HIGH (ref 3.8–10.5)
WBC # BLD: 15.48 K/UL — HIGH (ref 3.8–10.5)
WBC # FLD AUTO: 12.44 K/UL — HIGH (ref 3.8–10.5)
WBC # FLD AUTO: 15.48 K/UL — HIGH (ref 3.8–10.5)

## 2020-10-22 RX ADMIN — SODIUM CHLORIDE 3 MILLILITER(S): 9 INJECTION INTRAMUSCULAR; INTRAVENOUS; SUBCUTANEOUS at 14:30

## 2020-10-22 RX ADMIN — Medication 975 MILLIGRAM(S): at 06:16

## 2020-10-22 RX ADMIN — Medication 975 MILLIGRAM(S): at 14:18

## 2020-10-22 RX ADMIN — Medication 1 TABLET(S): at 16:12

## 2020-10-22 RX ADMIN — Medication 25 GM/HR: at 07:22

## 2020-10-22 RX ADMIN — INFLUENZA VIRUS VACCINE 0.5 MILLILITER(S): 15; 15; 15; 15 SUSPENSION INTRAMUSCULAR at 18:04

## 2020-10-22 RX ADMIN — Medication 975 MILLIGRAM(S): at 00:00

## 2020-10-22 RX ADMIN — Medication 975 MILLIGRAM(S): at 15:10

## 2020-10-22 RX ADMIN — Medication 25 MICROGRAM(S): at 06:16

## 2020-10-22 RX ADMIN — SODIUM CHLORIDE 3 MILLILITER(S): 9 INJECTION INTRAMUSCULAR; INTRAVENOUS; SUBCUTANEOUS at 05:56

## 2020-10-22 NOTE — PROGRESS NOTE ADULT - SUBJECTIVE AND OBJECTIVE BOX
36 y/o  PPD#1 s/p  with PNC complicated by severe preeclampsia on mg. Patient seen and examined at bedside, no acute overnight events. Pain well controlled. Patient is ambulating, voiding spontaneously, passing gas, and tolerating regular diet. Had headache earlier in the morning, but improved with eating. Denies CP, SOB, N/V, blurred vision, epigastric pain.    Vital Signs Last 24 Hours  T(C): 36.2 (10-22-20 @ 05:10), Max: 37 (10-21-20 @ :30)  HR: 72 (10-22-20 @ 05:10) (64 - 99)  BP: 96/60 (10-22-20 @ 05:10) (96/60 - 140/75)  RR: 16 (10-22-20 @ 05:10) (16 - 19)  SpO2: 100% (10-22-20 @ 05:10) (87% - 100%)    Physical Exam:  General: NAD  Abdomen: Soft, appropriately-tender, non-distended, fundus firm  Pelvic: Lochia wnl    Labs:    Blood Type: O Positive  Antibody Screen: --  RPR: Negative               11.1   15.48 )-----------( 169      ( 10-22 @ 05:28 )             34.6                10.7   18.31 )-----------( 150      ( 10-21 @ 16:30 )             33.5                11.8   15.50 )-----------( 158      ( 10-21 @ 09:30 )             36.9                  11.1   15.48 )-----------( 169      ( 10-22 @ 05:28 )             34.6     10-22 @ 05:28    134  |  103  |  18  ----------------------------<  94  3.7   |  22  |  1.55    Ca    7.9      10-22 @ 05:28  Mg     6.4     10-22 @ 05:28    TPro  5.6  /  Alb  2.2  /  TBili  < 0.2  /  DBili  x   /  AST  32  /  ALT  40  /  AlkPhos  207  10-22 @ 05:28    PT/INR - ( 10-22 @ 05:28 )   PT: 9.9 SEC;   INR: 0.86     PTT - ( 10-22 @ 05:28 )  PTT:27.5 SEC    Uric Acid: (10-22 @ 05:28)  7.8      Fibrinogen: (10-22 @ :28)  704.0    LDH: (10-22 @ 05:28)  261      MEDICATIONS  (STANDING):  acetaminophen   Tablet .. 975 milliGRAM(s) Oral <User Schedule>  diphtheria/tetanus/pertussis (acellular) Vaccine (ADAcel) 0.5 milliLiter(s) IntraMuscular once  ibuprofen  Tablet. 600 milliGRAM(s) Oral every 6 hours  influenza   Vaccine 0.5 milliLiter(s) IntraMuscular once  lactated ringers. 1000 milliLiter(s) (50 mL/Hr) IV Continuous <Continuous>  levothyroxine 25 MICROGram(s) Oral daily  magnesium sulfate Infusion 1 Gm/Hr (25 mL/Hr) IV Continuous <Continuous>  prenatal multivitamin 1 Tablet(s) Oral daily  sodium chloride 0.9% lock flush 3 milliLiter(s) IV Push every 8 hours    MEDICATIONS  (PRN):  benzocaine 20%/menthol 0.5% Spray 1 Spray(s) Topical every 6 hours PRN for Perineal discomfort  dibucaine 1% Ointment 1 Application(s) Topical every 6 hours PRN Perineal discomfort  diphenhydrAMINE 25 milliGRAM(s) Oral every 6 hours PRN Pruritus  hydrocortisone 1% Cream 1 Application(s) Topical every 6 hours PRN Moderate Pain (4-6)  lanolin Ointment 1 Application(s) Topical every 6 hours PRN nipple soreness  magnesium hydroxide Suspension 30 milliLiter(s) Oral two times a day PRN Constipation  oxyCODONE    IR 5 milliGRAM(s) Oral every 3 hours PRN Moderate to Severe Pain (4-10)  oxyCODONE    IR 5 milliGRAM(s) Oral once PRN Moderate to Severe Pain (4-10)  pramoxine 1%/zinc 5% Cream 1 Application(s) Topical every 4 hours PRN Moderate Pain (4-6)  simethicone 80 milliGRAM(s) Chew every 4 hours PRN Gas  witch hazel Pads 1 Application(s) Topical every 4 hours PRN Perineal discomfort

## 2020-10-22 NOTE — PROGRESS NOTE ADULT - PROBLEM SELECTOR PLAN 1
- Continue with po analgesia  - Increase ambulation  - Continue regular diet  - BP stable  - magnesium sulfate to d/c this AM  - HELLP labs this AM with downtrending Cr and downtrending AST/ALT    [place]  PGY-1

## 2020-10-22 NOTE — PROGRESS NOTE ADULT - ASSESSMENT
36 y/o  PPD#1 s/p  with PNC complicated by severe preeclampsia on mg. Patient is recovering well in the postpartum period.

## 2020-10-22 NOTE — CHART NOTE - NSCHARTNOTEFT_GEN_A_CORE
34y/o  PDD#1 @ 7007 s/p magnesium sulfate for sPEC on no antihypertensive. Prescription for electronic blood pressure cuff given and explained. Discussed signs/symptoms to report due to sPEC and home blood pressure monitoring. Patient verbalized an understanding. 34y/o  PDD#1 @ 8561 s/p magnesium sulfate for sPEC on no antihypertensive medications. Prescription for electronic blood pressure cuff given and explained. Discussed signs/symptoms to report due to sPEC and home blood pressure monitoring. Patient verbalized an understanding.

## 2020-10-22 NOTE — PROGRESS NOTE ADULT - SUBJECTIVE AND OBJECTIVE BOX
Post partum Day 1      Pt without complaints    Vital Signs Last 24 Hrs  T(C): 36.2 (22 Oct 2020 05:10), Max: 37 (21 Oct 2020 22:30)  T(F): 97.2 (22 Oct 2020 05:10), Max: 98.6 (21 Oct 2020 22:30)  HR: 72 (22 Oct 2020 05:10) (62 - 99)  BP: 96/60 (22 Oct 2020 05:10) (96/60 - 153/86)  BP(mean): --  RR: 16 (22 Oct 2020 05:10) (16 - 19)  SpO2: 100% (22 Oct 2020 05:10) (87% - 100%)                        11.1   15.48 )-----------( 169      ( 22 Oct 2020 05:28 )             34.6     MEDICATIONS  (STANDING):  acetaminophen   Tablet .. 975 milliGRAM(s) Oral <User Schedule>  diphtheria/tetanus/pertussis (acellular) Vaccine (ADAcel) 0.5 milliLiter(s) IntraMuscular once  ibuprofen  Tablet. 600 milliGRAM(s) Oral every 6 hours  influenza   Vaccine 0.5 milliLiter(s) IntraMuscular once  lactated ringers. 1000 milliLiter(s) (50 mL/Hr) IV Continuous <Continuous>  levothyroxine 25 MICROGram(s) Oral daily  magnesium sulfate Infusion 1 Gm/Hr (25 mL/Hr) IV Continuous <Continuous>  prenatal multivitamin 1 Tablet(s) Oral daily  sodium chloride 0.9% lock flush 3 milliLiter(s) IV Push every 8 hours    MEDICATIONS  (PRN):  benzocaine 20%/menthol 0.5% Spray 1 Spray(s) Topical every 6 hours PRN for Perineal discomfort  dibucaine 1% Ointment 1 Application(s) Topical every 6 hours PRN Perineal discomfort  diphenhydrAMINE 25 milliGRAM(s) Oral every 6 hours PRN Pruritus  hydrocortisone 1% Cream 1 Application(s) Topical every 6 hours PRN Moderate Pain (4-6)  lanolin Ointment 1 Application(s) Topical every 6 hours PRN nipple soreness  magnesium hydroxide Suspension 30 milliLiter(s) Oral two times a day PRN Constipation  oxyCODONE    IR 5 milliGRAM(s) Oral every 3 hours PRN Moderate to Severe Pain (4-10)  oxyCODONE    IR 5 milliGRAM(s) Oral once PRN Moderate to Severe Pain (4-10)  pramoxine 1%/zinc 5% Cream 1 Application(s) Topical every 4 hours PRN Moderate Pain (4-6)  simethicone 80 milliGRAM(s) Chew every 4 hours PRN Gas  witch hazel Pads 1 Application(s) Topical every 4 hours PRN Perineal discomfort      Abdomen soft  fundus firm, non tender  extremities non tender    lochia wnl      Patient doing well  Routine post partum care  Positive HELLP labs, elev lft and creatinine. Will treat as severe pec despite lack of severe range bp's

## 2020-10-23 VITALS
HEART RATE: 79 BPM | OXYGEN SATURATION: 98 % | RESPIRATION RATE: 18 BRPM | DIASTOLIC BLOOD PRESSURE: 75 MMHG | SYSTOLIC BLOOD PRESSURE: 134 MMHG | TEMPERATURE: 98 F

## 2020-10-23 LAB
ALBUMIN SERPL ELPH-MCNC: 2.6 G/DL — LOW (ref 3.3–5)
ALP SERPL-CCNC: 198 U/L — HIGH (ref 40–120)
ALT FLD-CCNC: 32 U/L — SIGNIFICANT CHANGE UP (ref 4–33)
ANION GAP SERPL CALC-SCNC: 7 MMO/L — SIGNIFICANT CHANGE UP (ref 7–14)
APTT BLD: 31.4 SEC — SIGNIFICANT CHANGE UP (ref 27–36.3)
AST SERPL-CCNC: 28 U/L — SIGNIFICANT CHANGE UP (ref 4–32)
BASOPHILS # BLD AUTO: 0.05 K/UL — SIGNIFICANT CHANGE UP (ref 0–0.2)
BASOPHILS NFR BLD AUTO: 0.4 % — SIGNIFICANT CHANGE UP (ref 0–2)
BILIRUB SERPL-MCNC: < 0.2 MG/DL — LOW (ref 0.2–1.2)
BUN SERPL-MCNC: 20 MG/DL — SIGNIFICANT CHANGE UP (ref 7–23)
CALCIUM SERPL-MCNC: 8.1 MG/DL — LOW (ref 8.4–10.5)
CHLORIDE SERPL-SCNC: 108 MMOL/L — HIGH (ref 98–107)
CO2 SERPL-SCNC: 22 MMOL/L — SIGNIFICANT CHANGE UP (ref 22–31)
CREAT SERPL-MCNC: 1.42 MG/DL — HIGH (ref 0.5–1.3)
EOSINOPHIL # BLD AUTO: 0.12 K/UL — SIGNIFICANT CHANGE UP (ref 0–0.5)
EOSINOPHIL NFR BLD AUTO: 1 % — SIGNIFICANT CHANGE UP (ref 0–6)
FIBRINOGEN PPP-MCNC: 667 MG/DL — HIGH (ref 290–520)
GLUCOSE SERPL-MCNC: 81 MG/DL — SIGNIFICANT CHANGE UP (ref 70–99)
HCT VFR BLD CALC: 32.6 % — LOW (ref 34.5–45)
HGB BLD-MCNC: 10.2 G/DL — LOW (ref 11.5–15.5)
IMM GRANULOCYTES NFR BLD AUTO: 0.4 % — SIGNIFICANT CHANGE UP (ref 0–1.5)
INR BLD: 0.84 — LOW (ref 0.88–1.16)
LDH SERPL L TO P-CCNC: 278 U/L — HIGH (ref 135–225)
LYMPHOCYTES # BLD AUTO: 3.71 K/UL — HIGH (ref 1–3.3)
LYMPHOCYTES # BLD AUTO: 31.5 % — SIGNIFICANT CHANGE UP (ref 13–44)
MCHC RBC-ENTMCNC: 27.9 PG — SIGNIFICANT CHANGE UP (ref 27–34)
MCHC RBC-ENTMCNC: 31.3 % — LOW (ref 32–36)
MCV RBC AUTO: 89.3 FL — SIGNIFICANT CHANGE UP (ref 80–100)
MONOCYTES # BLD AUTO: 0.72 K/UL — SIGNIFICANT CHANGE UP (ref 0–0.9)
MONOCYTES NFR BLD AUTO: 6.1 % — SIGNIFICANT CHANGE UP (ref 2–14)
NEUTROPHILS # BLD AUTO: 7.12 K/UL — SIGNIFICANT CHANGE UP (ref 1.8–7.4)
NEUTROPHILS NFR BLD AUTO: 60.6 % — SIGNIFICANT CHANGE UP (ref 43–77)
NRBC # FLD: 0 K/UL — SIGNIFICANT CHANGE UP (ref 0–0)
PLATELET # BLD AUTO: 227 K/UL — SIGNIFICANT CHANGE UP (ref 150–400)
PMV BLD: 11.6 FL — SIGNIFICANT CHANGE UP (ref 7–13)
POTASSIUM SERPL-MCNC: 4.4 MMOL/L — SIGNIFICANT CHANGE UP (ref 3.5–5.3)
POTASSIUM SERPL-SCNC: 4.4 MMOL/L — SIGNIFICANT CHANGE UP (ref 3.5–5.3)
PROT SERPL-MCNC: 5.6 G/DL — LOW (ref 6–8.3)
PROTHROM AB SERPL-ACNC: 9.7 SEC — LOW (ref 10.6–13.6)
RBC # BLD: 3.65 M/UL — LOW (ref 3.8–5.2)
RBC # FLD: 18.2 % — HIGH (ref 10.3–14.5)
SARS-COV-2 IGG SERPL QL IA: NEGATIVE — SIGNIFICANT CHANGE UP
SARS-COV-2 IGM SERPL IA-ACNC: <0.1 INDEX — SIGNIFICANT CHANGE UP
SODIUM SERPL-SCNC: 137 MMOL/L — SIGNIFICANT CHANGE UP (ref 135–145)
URATE SERPL-MCNC: 7 MG/DL — SIGNIFICANT CHANGE UP (ref 2.5–7)
WBC # BLD: 11.77 K/UL — HIGH (ref 3.8–10.5)
WBC # FLD AUTO: 11.77 K/UL — HIGH (ref 3.8–10.5)

## 2020-10-23 RX ADMIN — SODIUM CHLORIDE 3 MILLILITER(S): 9 INJECTION INTRAMUSCULAR; INTRAVENOUS; SUBCUTANEOUS at 06:26

## 2020-10-23 RX ADMIN — PRAMOXINE HYDROCHLORIDE 1 APPLICATION(S): 150 AEROSOL, FOAM RECTAL at 06:26

## 2020-10-23 RX ADMIN — Medication 975 MILLIGRAM(S): at 06:27

## 2020-10-23 RX ADMIN — Medication 25 MICROGRAM(S): at 06:25

## 2020-10-23 NOTE — PROGRESS NOTE ADULT - SUBJECTIVE AND OBJECTIVE BOX
OB Progress Note:  PPD#2    S: 36yo  PPD#2 s/p . Pain is well controlled. She is tolerating a regular diet, voiding spontaneously, ambulating, and passing flatus. No headache, RUQ pain, or vision changes. Denies chest pain, SOB, lightheadedness, dizziness, n/v, fever/chills, or heavy vaginal bleeding. No other concerns    O:  Vitals:  Vital Signs Last 24 Hrs  T(C): 36.7 (22 Oct 2020 21:30), Max: 36.7 (22 Oct 2020 21:30)  T(F): 98 (22 Oct 2020 21:30), Max: 98 (22 Oct 2020 21:30)  HR: 76 (22 Oct 2020 21:30) (70 - 78)  BP: 119/77 (22 Oct 2020 21:30) (96/60 - 126/78)  BP(mean): --  RR: 18 (22 Oct 2020 21:30) (16 - 18)  SpO2: 100% (22 Oct 2020 21:30) (98% - 100%)    Physical Exam:  General: NAD  Abdomen: soft, non-tender, non-distended, fundus firm  Extremities: No erythema/edema  Vaginal: lochia wnl    MEDICATIONS  (STANDING):  acetaminophen   Tablet .. 975 milliGRAM(s) Oral <User Schedule>  diphtheria/tetanus/pertussis (acellular) Vaccine (ADAcel) 0.5 milliLiter(s) IntraMuscular once  ibuprofen  Tablet. 600 milliGRAM(s) Oral every 6 hours  levothyroxine 25 MICROGram(s) Oral daily  prenatal multivitamin 1 Tablet(s) Oral daily  sodium chloride 0.9% lock flush 3 milliLiter(s) IV Push every 8 hours      Labs:  Blood type: O Positive  Rubella IgG: RPR: Negative                          10.3<L>   12.44<H> >-----------< 183    ( 10-22 @ 16:00 )             32.4<L>                        11.1<L>   15.48<H> >-----------< 169    ( 10-22 @ 05:28 )             34.6                        10.7<L>   18.31<H> >-----------< 150    ( 10-21 @ 16:30 )             33.5<L>                        11.8   15.50<H> >-----------< 158    ( 10-21 @ 09:30 )             36.9    10-22-20 @ 16:00      138  |  106  |  18  ----------------------------<  80  4.0   |  22  |  1.47<H>    10-22-20 @ 05:28      134<L>  |  103  |  18  ----------------------------<  94  3.7   |  22  |  1.55<H>    10-21-20 @ 16:30      133<L>  |  102  |  21  ----------------------------<  116<H>  4.3   |  20<L>  |  1.64<H>    10-21-20 @ 09:30      130<L>  |  99  |  20  ----------------------------<  112<H>  4.7   |  17<L>  |  1.76<H>        Ca    7.6<L>      22 Oct 2020 16:00  Ca    7.9<L>      22 Oct 2020 05:28  Ca    8.2<L>      21 Oct 2020 16:30  Ca    8.7      21 Oct 2020 09:30  Mg     6.4<H>     10-22  Mg     6.4<H>     10-21  Mg     5.3<H>     10-21    TPro  5.5<L>  /  Alb  2.6<L>  /  TBili  < 0.2<L>  /  DBili  x   /  AST  29  /  ALT  35<H>  /  AlkPhos  210<H>  10-22-20 @ 16:00  TPro  5.6<L>  /  Alb  2.2<L>  /  TBili  < 0.2<L>  /  DBili  x   /  AST  32  /  ALT  40<H>  /  AlkPhos  207<H>  10-22-20 @ 05:28  TPro  5.2<L>  /  Alb  2.5<L>  /  TBili  0.3  /  DBili  x   /  AST  46<H>  /  ALT  47<H>  /  AlkPhos  223<H>  10-21-20 @ 16:30  TPro  5.9<L>  /  Alb  2.5<L>  /  TBili  0.4  /  DBili  x   /  AST  45<H>  /  ALT  49<H>  /  AlkPhos  252<H>  10-21-20 @ 09:30          A/P: 36yo PPD#2 s/p  c/b sPEC s/p magnesium with elevated Cr and AST/ALT that are trending down.  Patient is stable and doing well post-partum.   - sPEC: f/u AM HELLP labs  - Pain well controlled, continue current pain regimen  - Continue regular diet    Haley Randall, PGY1

## 2020-10-24 ENCOUNTER — EMERGENCY (EMERGENCY)
Facility: HOSPITAL | Age: 35
LOS: 1 days | Discharge: HOME | End: 2020-10-24
Attending: EMERGENCY MEDICINE | Admitting: EMERGENCY MEDICINE
Payer: COMMERCIAL

## 2020-10-24 VITALS
SYSTOLIC BLOOD PRESSURE: 149 MMHG | DIASTOLIC BLOOD PRESSURE: 88 MMHG | HEIGHT: 62 IN | RESPIRATION RATE: 18 BRPM | OXYGEN SATURATION: 100 % | TEMPERATURE: 98 F | HEART RATE: 79 BPM

## 2020-10-24 VITALS
SYSTOLIC BLOOD PRESSURE: 124 MMHG | DIASTOLIC BLOOD PRESSURE: 83 MMHG | RESPIRATION RATE: 24 BRPM | HEART RATE: 82 BPM | OXYGEN SATURATION: 100 %

## 2020-10-24 LAB
ALBUMIN SERPL ELPH-MCNC: 3 G/DL — LOW (ref 3.3–5)
ALP SERPL-CCNC: 230 U/L — HIGH (ref 40–120)
ALT FLD-CCNC: 62 U/L — HIGH (ref 4–33)
ANION GAP SERPL CALC-SCNC: 15 MMO/L — HIGH (ref 7–14)
APPEARANCE UR: SIGNIFICANT CHANGE UP
APTT BLD: 30.1 SEC — SIGNIFICANT CHANGE UP (ref 27–36.3)
AST SERPL-CCNC: 80 U/L — HIGH (ref 4–32)
BACTERIA # UR AUTO: SIGNIFICANT CHANGE UP
BASOPHILS # BLD AUTO: 0.05 K/UL — SIGNIFICANT CHANGE UP (ref 0–0.2)
BASOPHILS NFR BLD AUTO: 0.4 % — SIGNIFICANT CHANGE UP (ref 0–2)
BILIRUB SERPL-MCNC: 0.2 MG/DL — SIGNIFICANT CHANGE UP (ref 0.2–1.2)
BILIRUB UR-MCNC: NEGATIVE — SIGNIFICANT CHANGE UP
BLOOD UR QL VISUAL: HIGH
BUN SERPL-MCNC: 19 MG/DL — SIGNIFICANT CHANGE UP (ref 7–23)
CALCIUM SERPL-MCNC: 8.9 MG/DL — SIGNIFICANT CHANGE UP (ref 8.4–10.5)
CHLORIDE SERPL-SCNC: 103 MMOL/L — SIGNIFICANT CHANGE UP (ref 98–107)
CO2 SERPL-SCNC: 18 MMOL/L — LOW (ref 22–31)
COLOR SPEC: SIGNIFICANT CHANGE UP
CREAT ?TM UR-MCNC: 67.1 MG/DL — SIGNIFICANT CHANGE UP
CREAT SERPL-MCNC: 1.03 MG/DL — SIGNIFICANT CHANGE UP (ref 0.5–1.3)
EOSINOPHIL # BLD AUTO: 0.13 K/UL — SIGNIFICANT CHANGE UP (ref 0–0.5)
EOSINOPHIL NFR BLD AUTO: 1.1 % — SIGNIFICANT CHANGE UP (ref 0–6)
FIBRINOGEN PPP-MCNC: 789 MG/DL — HIGH (ref 290–520)
GLUCOSE SERPL-MCNC: 86 MG/DL — SIGNIFICANT CHANGE UP (ref 70–99)
GLUCOSE UR-MCNC: NEGATIVE — SIGNIFICANT CHANGE UP
HCT VFR BLD CALC: 33.7 % — LOW (ref 34.5–45)
HGB BLD-MCNC: 10.8 G/DL — LOW (ref 11.5–15.5)
HYALINE CASTS # UR AUTO: SIGNIFICANT CHANGE UP
IMM GRANULOCYTES NFR BLD AUTO: 0.7 % — SIGNIFICANT CHANGE UP (ref 0–1.5)
INR BLD: 0.96 — SIGNIFICANT CHANGE UP (ref 0.88–1.16)
KETONES UR-MCNC: NEGATIVE — SIGNIFICANT CHANGE UP
LDH SERPL L TO P-CCNC: 303 U/L — HIGH (ref 135–225)
LEUKOCYTE ESTERASE UR-ACNC: SIGNIFICANT CHANGE UP
LYMPHOCYTES # BLD AUTO: 2.83 K/UL — SIGNIFICANT CHANGE UP (ref 1–3.3)
LYMPHOCYTES # BLD AUTO: 23.4 % — SIGNIFICANT CHANGE UP (ref 13–44)
MCHC RBC-ENTMCNC: 28.2 PG — SIGNIFICANT CHANGE UP (ref 27–34)
MCHC RBC-ENTMCNC: 32 % — SIGNIFICANT CHANGE UP (ref 32–36)
MCV RBC AUTO: 88 FL — SIGNIFICANT CHANGE UP (ref 80–100)
MONOCYTES # BLD AUTO: 0.71 K/UL — SIGNIFICANT CHANGE UP (ref 0–0.9)
MONOCYTES NFR BLD AUTO: 5.9 % — SIGNIFICANT CHANGE UP (ref 2–14)
NEUTROPHILS # BLD AUTO: 8.27 K/UL — HIGH (ref 1.8–7.4)
NEUTROPHILS NFR BLD AUTO: 68.5 % — SIGNIFICANT CHANGE UP (ref 43–77)
NITRITE UR-MCNC: NEGATIVE — SIGNIFICANT CHANGE UP
NRBC # FLD: 0 K/UL — SIGNIFICANT CHANGE UP (ref 0–0)
PH UR: 6 — SIGNIFICANT CHANGE UP (ref 5–8)
PLATELET # BLD AUTO: 276 K/UL — SIGNIFICANT CHANGE UP (ref 150–400)
PMV BLD: 11.1 FL — SIGNIFICANT CHANGE UP (ref 7–13)
POTASSIUM SERPL-MCNC: 4.3 MMOL/L — SIGNIFICANT CHANGE UP (ref 3.5–5.3)
POTASSIUM SERPL-SCNC: 4.3 MMOL/L — SIGNIFICANT CHANGE UP (ref 3.5–5.3)
PROT SERPL-MCNC: 6.6 G/DL — SIGNIFICANT CHANGE UP (ref 6–8.3)
PROT UR-MCNC: 11.3 MG/DL — SIGNIFICANT CHANGE UP
PROT UR-MCNC: NEGATIVE — SIGNIFICANT CHANGE UP
PROTHROM AB SERPL-ACNC: 11.1 SEC — SIGNIFICANT CHANGE UP (ref 10.6–13.6)
RBC # BLD: 3.83 M/UL — SIGNIFICANT CHANGE UP (ref 3.8–5.2)
RBC # FLD: 18.1 % — HIGH (ref 10.3–14.5)
RBC CASTS # UR COMP ASSIST: >50 — HIGH (ref 0–?)
SODIUM SERPL-SCNC: 136 MMOL/L — SIGNIFICANT CHANGE UP (ref 135–145)
SP GR SPEC: 1.01 — SIGNIFICANT CHANGE UP (ref 1–1.04)
SQUAMOUS # UR AUTO: SIGNIFICANT CHANGE UP
URATE SERPL-MCNC: 6.5 MG/DL — SIGNIFICANT CHANGE UP (ref 2.5–7)
UROBILINOGEN FLD QL: NORMAL — SIGNIFICANT CHANGE UP
WBC # BLD: 12.07 K/UL — HIGH (ref 3.8–10.5)
WBC # FLD AUTO: 12.07 K/UL — HIGH (ref 3.8–10.5)
WBC UR QL: >50 — HIGH (ref 0–?)

## 2020-10-24 PROCEDURE — 93971 EXTREMITY STUDY: CPT | Mod: 26,LT

## 2020-10-24 PROCEDURE — 93010 ELECTROCARDIOGRAM REPORT: CPT | Mod: NC

## 2020-10-24 PROCEDURE — 99284 EMERGENCY DEPT VISIT MOD MDM: CPT | Mod: 25

## 2020-10-24 NOTE — ED ADULT NURSE NOTE - PAIN RATING/NUMBER SCALE (0-10): REST
If you are a smoker, it is important for your health to stop smoking. Please be aware that second hand smoke is also harmful.
0

## 2020-10-24 NOTE — ED PROVIDER NOTE - PROGRESS NOTE DETAILS
Dr. Queen: Contacted D&T who noted pt should be ruled out for DVT and then OB team can be consulted for BP control. Dr. Queen: Repeat BP on larger cuff more her size, 128/68. michael pgy3: Patient reassessed, NAD, non-toxic appearing. well appearing, sx improved. ob recs appreciated. dc w/ strict return precautions. pt provides teachback. pulses dopplerable b/l dp 2+.

## 2020-10-24 NOTE — ED ADULT TRIAGE NOTE - CHIEF COMPLAINT QUOTE
Pt instructed to come into ED for possible post-partum pre-eclampsia, pt has decreased sensation/numbness to Lt leg since yesterday. Pt delivered Wednesday. Pt denies chest pain, no shortness of breath, no headache/dizziness. L&D contacted, pt to be seen and treated in ED.

## 2020-10-24 NOTE — ED PROVIDER NOTE - OBJECTIVE STATEMENT
34 y/o female with PMHx of pre-eclampsia with recent induction of delivery 3 days ago who presented to the ED for decreased sensation to left LE X 1 day. Pt states over the past day after being discharged having decreased sensation to left LE. Pt denies any increased swelling compared to right or any trauma. Pt recently was induced and delivered through vaginal delivery 3 days ago. Pt notes she was on Mg after but has not been on medication since discharge. Pt denies any headache, vision changes, fever, chills, nausea, vomiting, SOB, chest pain, or abd pain.

## 2020-10-24 NOTE — ED PROVIDER NOTE - SEVERE SEPSIS ALERT DETAILS
Dr. Queen: Given presentation and recent delivery, more likely a DVT and post inflammatory changes over a bacterial infection.

## 2020-10-24 NOTE — ED PROVIDER NOTE - CLINICAL SUMMARY MEDICAL DECISION MAKING FREE TEXT BOX
34 y/o female with PMHx of pre-eclampsia with recent induction of delivery 3 days ago who presented to the ED for decreased sensation to left LE X 1 day.   Concern for DVT/Elevated BP but given presentation and repeat BP with proper cuff less likely pre-eclampsia  Labs, US, OB/Gyn consult

## 2020-10-24 NOTE — ED ADULT NURSE NOTE - NSIMPLEMENTINTERV_GEN_ALL_ED
Implemented All Fall Risk Interventions:  [place] to call system. Call bell, personal items and telephone within reach. Instruct patient to call for assistance. Room bathroom lighting operational. Non-slip footwear when patient is off stretcher. Physically safe environment: no spills, clutter or unnecessary equipment. Stretcher in lowest position, wheels locked, appropriate side rails in place. Provide visual cue, wrist band, yellow gown, etc. Monitor gait and stability. Monitor for mental status changes and reorient to person, place, and time. Review medications for side effects contributing to fall risk. Reinforce activity limits and safety measures with patient and family.

## 2020-10-24 NOTE — ED PROVIDER NOTE - PATIENT PORTAL LINK FT
You can access the FollowMyHealth Patient Portal offered by [facility] by registering at the following website: [email or web address]. By joining [facility]’s FollowMyHealth portal, you will also be able to view your health information using other applications (apps) compatible with our system.

## 2020-10-24 NOTE — CONSULT NOTE ADULT - SUBJECTIVE AND OBJECTIVE BOX
R2 GYNECOLOGY CONSULT NOTE    36yo  PPD#3 s/p  presenting to ED with c/o numbness and tingling in left calf which started 6PM the night prior. Did not have these issues at the hospital. Sensation of numbness associated with feeling off-balance; did not fall or trip. Pt called office 2/2 persistence of sensation and pt was instructed to present to ED. Reports improvement since presentation to ED. This is a first time episode. Denies HA, vision changes, RUQ or epigastric pain. Denies nausea/vomiting, CP/SOB, fevers/chills. She is breast feeding. '    Intrapartum/pp course c/b sPEC, s/p Mg for seizure ppx; dx by BPs and elevated Cr. Pt reports using Tylenol; not using Motrin 2/2 elevated Cr. Reports normal lochia.     OB/GYN HISTORY:   G1 s/p  10/21/2020 IOL 2/2 dec FM at 40w2d  denies fibroids, ov cysts, abnl Pap, STIs  OBGYN = [place]    PAST MEDICAL & SURGICAL HISTORY:  Hypothyroidism, dx during preg  No significant past surgical history    Medications: none  Allergies: No Known Allergies    SOCIAL HISTORY: denies T/E/D    REVIEW OF SYSTEMS:  CONSTITUTIONAL: No weakness, fevers or chills  EYES/ENT: No visual changes;  No vertigo or throat pain   RESPIRATORY: No cough, wheezing, hemoptysis; No shortness of breath  CARDIOVASCULAR: No chest pain or palpitations  GASTROINTESTINAL: No abdominal or epigastric pain. No nausea, vomiting, or hematemesis; No diarrhea or constipation. No melena or hematochezia.  GENITOURINARY: +lochia; No dysuria, frequency or hematuria  NEUROLOGICAL: ---- No numbness or weakness  All other review of systems is negative unless indicated above.    Vital Signs Last 24 Hrs  T(C): 36.8 (24 Oct 2020 10:11), Max: 36.8 (24 Oct 2020 10:11)  T(F): 98.2 (24 Oct 2020 10:11), Max: 98.2 (24 Oct 2020 10:11)  HR: 83 (24 Oct 2020 10:50) (79 - 83)  BP: 133/62 (24 Oct 2020 10:56) (118/58 - 149/88)  RR: 22 (24 Oct 2020 10:50) (18 - 22)  SpO2: 100% (24 Oct 2020 10:50) (100% - 100%)    PHYSICAL EXAM:      Constitutional: alert and oriented x 3    Breasts: no tenderness, no nodules    Respiratory: clear    Cardiovascular: regular rate and rhythm    Gastrointestinal: soft, non tender, + bowel sounds. No hepatosplenomegaly, no palpable masses    Genitourinary: NEFG  Cervix: closed/ long, no CMT  Uterus: normal size, non tender  Adnexa: non tender, no palpable masses    Rectal:     Extremities:    Neurological:    Skin:    Lymph Nodes:        LABS:                        10.8   12.07 )-----------( 276      ( 24 Oct 2020 10:16 )             33.7     10-24    136  |  103  |  19  ----------------------------<  86  4.3   |  18<L>  |  1.03    Ca    8.9      24 Oct 2020 10:16    TPro  6.6  /  Alb  3.0<L>  /  TBili  0.2  /  DBili  x   /  AST  80<H>  /  ALT  62<H>  /  AlkPhos  230<H>  1024    PT/INR - ( 24 Oct 2020 10:16 )   PT: 11.1 SEC;   INR: 0.96          PTT - ( 24 Oct 2020 10:16 )  PTT:30.1 SEC  Urinalysis Basic - ( 24 Oct 2020 11:58 )    Color: LIGHT YELLOW / Appearance: Lt TURBID / S.013 / pH: 6.0  Gluc: NEGATIVE / Ketone: NEGATIVE  / Bili: NEGATIVE / Urobili: NORMAL   Blood: LARGE / Protein: NEGATIVE / Nitrite: NEGATIVE   Leuk Esterase: LARGE / RBC: >50 / WBC >50   Sq Epi: OCC / Non Sq Epi: x / Bacteria: FEW        Blood Type: O Positive      RADIOLOGY & ADDITIONAL STUDIES:           R2 GYNECOLOGY CONSULT NOTE    34yo  PPD#3 s/p  presenting to ED with c/o numbness and tingling in left calf which started 6PM the night prior. Did not have these issues at the hospital. Sensation of numbness associated with feeling off-balance; did not fall or trip. Pt called office 2/2 persistence of sensation and pt was instructed to present to ED. Reports improvement since presentation to ED after keeping legs elevated and increased hydration. This is a first time episode. Denies HA, vision changes, RUQ or epigastric pain. Denies nausea/vomiting, CP/SOB, fevers/chills. She is breast feeding.     Intrapartum/pp course c/b sPEC, s/p Mg for seizure ppx; dx by BPs and elevated Cr. Pt reports using Tylenol; not using Motrin 2/2 elevated Cr. Reports normal lochia.     OB/GYN HISTORY:   G1 s/p  10/21/2020 IOL  dec FM at 40w2d  denies fibroids, ov cysts, abnl Pap, STIs  OBGYN = [facility]    PAST MEDICAL & SURGICAL HISTORY:  Hypothyroidism, dx during preg  No significant past surgical history    Medications: none  Allergies: No Known Allergies    SOCIAL HISTORY: denies T/E/D    REVIEW OF SYSTEMS:  CONSTITUTIONAL: No weakness, fevers or chills  EYES/ENT: No visual changes;  No vertigo or throat pain   RESPIRATORY: No cough, wheezing, hemoptysis; No shortness of breath  CARDIOVASCULAR: No chest pain or palpitations  GASTROINTESTINAL: No abdominal or epigastric pain. No nausea, vomiting, or hematemesis; No diarrhea or constipation. No melena or hematochezia.  GENITOURINARY: +lochia; No dysuria, frequency or hematuria  NEUROLOGICAL: No numbness or weakness  All other review of systems is negative unless indicated above.    Vital Signs Last 24 Hrs  T(C): 36.8 (24 Oct 2020 10:11), Max: 36.8 (24 Oct 2020 10:11)  T(F): 98.2 (24 Oct 2020 10:11), Max: 98.2 (24 Oct 2020 10:11)  HR: 83 (24 Oct 2020 10:50) (79 - 83)  BP: 133/62 (24 Oct 2020 10:56) (118/58 - 149/88)  RR: 22 (24 Oct 2020 10:50) (18 - 22)  SpO2: 100% (24 Oct 2020 10:50) (100% - 100%)    PHYSICAL EXAM:  Gen: NAD, resting comfortably in bed  Pulm: CTAB  CV: Normal S1/S2  Gastrointestinal: soft, non tender, nondistended; no fundal tenderness  : scant lochia on pad  Extremities: +2 edema b/l      LABS:                        10.8   12.07 )-----------( 276      ( 24 Oct 2020 10:16 )             33.7     10    136  |  103  |  19  ----------------------------<  86  4.3   |  18<L>  |  1.03    Ca    8.9      24 Oct 2020 10:16    TPro  6.6  /  Alb  3.0<L>  /  TBili  0.2  /  DBili  x   /  AST  80<H>  /  ALT  62<H>  /  AlkPhos  230<H>  1024    PT/INR - ( 24 Oct 2020 10:16 )   PT: 11.1 SEC;   INR: 0.96          PTT - ( 24 Oct 2020 10:16 )  PTT:30.1 SEC  Urinalysis Basic - ( 24 Oct 2020 11:58 )    Color: LIGHT YELLOW / Appearance: Lt TURBID / S.013 / pH: 6.0  Gluc: NEGATIVE / Ketone: NEGATIVE  / Bili: NEGATIVE / Urobili: NORMAL   Blood: LARGE / Protein: NEGATIVE / Nitrite: NEGATIVE   Leuk Esterase: LARGE / RBC: >50 / WBC >50   Sq Epi: OCC / Non Sq Epi: x / Bacteria: FEW        Blood Type: O Positive      RADIOLOGY & ADDITIONAL STUDIES:  < from: US Duplex Venous Lower Ext Ltd, Left (10.24.20 @ 12:14) >    EXAM:  US DPLX LWR EXT VEINS LTD LT        PROCEDURE DATE:  Oct 24 2020   INTERPRETATION:  CLINICAL INFORMATION: Left leg swelling    Concern for DVT Left leg with change in sensation with recent pregnancy    COMPARISON: None available.    TECHNIQUE: Duplex sonography of the LEFT LOWER extremity veins with color and spectral Doppler, with and without compression.    FINDINGS:    There is normal compressibility of the left common femoral, femoral and popliteal veins.  The contralateral common femoral vein is patent.  Doppler examination shows normal spontaneous and phasic flow.    No calf vein thrombosis is detected.    IMPRESSION:  No evidence of left lower extremity deep venous thrombosis.      LUCA BLAKE MD; Attending Radiologist  This document has been electronically signed. Oct 24 2020 12:28PM    < end of copied text >

## 2020-10-24 NOTE — CONSULT NOTE ADULT - ASSESSMENT
36yo  PPD#3 s/p  c/b sPEC s/p Mg for seizure presenting to ED with c/o numbness and tingling in left calf which has improved with elevation of LE and hydration. Pt w/o PIH sx and no evidence of DVT.  - HELLP labs notable for mild uptrend in LFTs; P/C inconsequential in pp period  - Pt reporting improvement in sx since presentation to ED with supportive measures  - Encourage pt to continue PO hydration, increase ambulation, and keep LE elevated  - Rec avoiding Tylenol for pain at this time in setting of elevated LFTs; can use Motrin in setting of improved Cr  - C/w monitoring BPs at home  - Pt has appt with Dr. Rios this week; will have repeat HELLP labs at that time.    d/w Dr. Kristen Grullon R2

## 2020-10-24 NOTE — ED PROVIDER NOTE - MUSCULOSKELETAL, MLM
Spine appears normal, range of motion is not limited, no muscle or joint tenderness. No calf tenderness. Mild b/l swelling. + distal pulses. Warm b/l. Intact sensation on exam b/l.

## 2020-10-24 NOTE — ED PROVIDER NOTE - NSFOLLOWUPINSTRUCTIONS_ED_ALL_ED_FT
1) Please follow up with your Obstetrician in 24-48 hours  2) Seek immediate medical care for any new or returning symptoms including but not limited severe pain, headaches, abdominal pain, numbness and/or tingling in any of your extremities  3) Please take all medications as directed  4) Please avoid Tylenol until you have a repeat CMP drawn in one week  5) Please monitor your blood pressure closely over the next week, if it is elevated please come back to the Emergency Department immediately

## 2020-10-24 NOTE — ED ADULT NURSE NOTE - OBJECTIVE STATEMENT
Patient A&Ox4, arrives for weakness and decreased sensation to left leg. Patient post partum x3 days for vaginal birth. Patient dx with preeclampsia after birth. Patient denies any chest pain, headaches, dizziness, SOB , or vision changes. Patient has bilateral upper and lower sensation noted.  Patient denies any dysuria. Patient reports she is still having some vaginal bleeding. Pedal pulses palpated bilaterally. Mild swelling noted to bilateral lower extremities. 20g IV placed to right AC. Cardiac Monitor in place- Normal Sinus Rhythm. Patient A&Ox4, arrives for weakness, tingling, and decreased sensation to left leg starting last night. Patient able to ambulate independently.  Patient post partum x3 days for vaginal birth. Patient dx with preeclampsia after birth (given magnesium in hospital for one day only, not discharged with any home bp medications). Patient denies any chest pain, headaches, dizziness, SOB , or vision changes. Upon assessment, even and equal bilateral upper and lower extremity sensation noted.  Patient reports burning upon urination for the past four days. Patient reports she is still having some vaginal bleeding. Pedal pulses palpated bilaterally. Mild swelling noted to bilateral lower extremities. 20g IV placed to right AC. Cardiac Monitor in place- Normal Sinus Rhythm. Patient A&Ox4, arrives for weakness, tingling, and decreased sensation to left leg starting last night. Patient able to ambulate independently.  Patient post partum x3 days for vaginal birth. Patient dx with preeclampsia after birth (given magnesium in hospital for one day only, not discharged with any home bp medications). Patient denies any chest pain, headaches, dizziness, SOB , or vision changes. Upon assessment, even and equal bilateral upper and lower extremity sensation noted.  Patient reports burning upon urination for the past four days (Patient reports being catheterized during labor).  Patient denies any fevers. Patient reports she is still having some vaginal bleeding but improving. No tenderness noted on palpation of the abdomen.  Pedal pulses palpated bilaterally. Mild swelling noted to bilateral lower extremities. 20g IV placed to right AC. Cardiac Monitor in place- Normal Sinus Rhythm.

## 2020-10-29 PROBLEM — Z00.00 ENCOUNTER FOR PREVENTIVE HEALTH EXAMINATION: Status: ACTIVE | Noted: 2020-10-29

## 2020-12-28 ENCOUNTER — RESULT REVIEW (OUTPATIENT)
Age: 35
End: 2020-12-28

## 2021-03-05 ENCOUNTER — APPOINTMENT (OUTPATIENT)
Dept: ANTEPARTUM | Facility: CLINIC | Age: 36
End: 2021-03-05
Payer: COMMERCIAL

## 2021-03-05 PROCEDURE — 76801 OB US < 14 WKS SINGLE FETUS: CPT

## 2021-03-05 PROCEDURE — 76813 OB US NUCHAL MEAS 1 GEST: CPT

## 2021-04-22 ENCOUNTER — APPOINTMENT (OUTPATIENT)
Dept: ANTEPARTUM | Facility: CLINIC | Age: 36
End: 2021-04-22

## 2021-11-18 ENCOUNTER — RESULT REVIEW (OUTPATIENT)
Age: 36
End: 2021-11-18

## 2022-01-31 ENCOUNTER — TRANSCRIPTION ENCOUNTER (OUTPATIENT)
Age: 37
End: 2022-01-31

## 2022-02-01 ENCOUNTER — TRANSCRIPTION ENCOUNTER (OUTPATIENT)
Age: 37
End: 2022-02-01

## 2022-06-10 NOTE — CONSULT NOTE ADULT - PROVIDER SPECIALTY LIST ADULT
Pt stable, antibiotics given awaiting bed placement.   tx plan explained understanding verbalized GYN

## 2022-08-02 ENCOUNTER — EMERGENCY (EMERGENCY)
Facility: HOSPITAL | Age: 37
LOS: 1 days | Discharge: STILL ADMITTED OR ADMINISTRATIVE | End: 2022-08-02
Admitting: EMERGENCY MEDICINE

## 2022-08-02 ENCOUNTER — OUTPATIENT (OUTPATIENT)
Dept: INPATIENT UNIT | Facility: HOSPITAL | Age: 37
LOS: 1 days | Discharge: HOME | End: 2022-08-02

## 2022-08-02 VITALS
HEART RATE: 89 BPM | DIASTOLIC BLOOD PRESSURE: 59 MMHG | RESPIRATION RATE: 15 BRPM | TEMPERATURE: 98 F | SYSTOLIC BLOOD PRESSURE: 123 MMHG

## 2022-08-02 VITALS
HEIGHT: 62 IN | HEART RATE: 89 BPM | DIASTOLIC BLOOD PRESSURE: 98 MMHG | TEMPERATURE: 98 F | SYSTOLIC BLOOD PRESSURE: 134 MMHG | RESPIRATION RATE: 18 BRPM | OXYGEN SATURATION: 98 %

## 2022-08-02 DIAGNOSIS — Z3A.00 WEEKS OF GESTATION OF PREGNANCY NOT SPECIFIED: ICD-10-CM

## 2022-08-02 DIAGNOSIS — O26.899 OTHER SPECIFIED PREGNANCY RELATED CONDITIONS, UNSPECIFIED TRIMESTER: ICD-10-CM

## 2022-08-02 PROCEDURE — 76816 OB US FOLLOW-UP PER FETUS: CPT | Mod: 26

## 2022-08-02 PROCEDURE — L9996: CPT

## 2022-08-02 PROCEDURE — 76817 TRANSVAGINAL US OBSTETRIC: CPT | Mod: 26

## 2022-08-02 PROCEDURE — 76818 FETAL BIOPHYS PROFILE W/NST: CPT | Mod: 26

## 2022-08-02 PROCEDURE — 99214 OFFICE O/P EST MOD 30 MIN: CPT | Mod: 25

## 2022-08-02 NOTE — ED ADULT TRIAGE NOTE - MEANS OF ARRIVAL
[facility]    Progress Note    Angy Busby Patient Status:  Observation    1935 MRN [id number]   Location [facility] 1W Attending Alfa Ware MD   Hosp Day # 0 PCP Rosalina More MD       Subjective:   Vanessa Rutledge (REGLAN) injection 10 mg, 10 mg, Intravenous, Q8H PRN  •  allopurinol (ZYLOPRIM) tab 100 mg, 100 mg, Oral, Daily  •  cetirizine (ZYRTEC) tab 5 mg, 5 mg, Oral, Daily  •  metoprolol succinate (Toprol XL) 24 hr tab 25 mg, 25 mg, Oral, Daily  •  lidocaine-ment stretcher

## 2022-08-02 NOTE — ED ADULT TRIAGE NOTE - CHIEF COMPLAINT QUOTE
30 weeks pregnant. fell onto right side. hasn't felt any fetal movement since fall. also c/o left ankle and right knee pain.

## 2022-08-02 NOTE — OB RN TRIAGE NOTE - FALL HARM RISK - RISK INTERVENTIONS

## 2022-08-02 NOTE — OB RN TRIAGE NOTE - NSICDXPASTMEDICALHX_GEN_ALL_CORE_FT
PAST MEDICAL HISTORY:  Hypothyroidism dx durung preg     (normal spontaneous vaginal delivery) 10/21/2020 - male 7#2

## 2022-08-02 NOTE — OB RN TRIAGE NOTE - CHIEF COMPLAINT QUOTE
tripped on sidewalk and hit the left side of her abdomen - twisted her left ankle and hit her right knee

## 2022-08-03 VITALS — HEART RATE: 81 BPM | SYSTOLIC BLOOD PRESSURE: 111 MMHG | DIASTOLIC BLOOD PRESSURE: 60 MMHG

## 2022-08-03 LAB
APPEARANCE UR: ABNORMAL
APTT BLD: 29.1 SEC — SIGNIFICANT CHANGE UP (ref 27–36.3)
BACTERIA # UR AUTO: ABNORMAL
BILIRUB UR-MCNC: NEGATIVE — SIGNIFICANT CHANGE UP
COLOR SPEC: SIGNIFICANT CHANGE UP
DIFF PNL FLD: NEGATIVE — SIGNIFICANT CHANGE UP
EPI CELLS # UR: ABNORMAL
FIBRINOGEN PPP-MCNC: 754 MG/DL — HIGH (ref 330–520)
GLUCOSE UR QL: NEGATIVE — SIGNIFICANT CHANGE UP
HCT VFR BLD CALC: 33.2 % — LOW (ref 34.5–45)
HGB BLD-MCNC: 11 G/DL — LOW (ref 11.5–15.5)
HYALINE CASTS # UR AUTO: 1 /LPF — SIGNIFICANT CHANGE UP (ref 0–7)
INR BLD: 0.97 RATIO — SIGNIFICANT CHANGE UP (ref 0.88–1.16)
KETONES UR-MCNC: NEGATIVE — SIGNIFICANT CHANGE UP
LEUKOCYTE ESTERASE UR-ACNC: ABNORMAL
MCHC RBC-ENTMCNC: 29.6 PG — SIGNIFICANT CHANGE UP (ref 27–34)
MCHC RBC-ENTMCNC: 33.1 GM/DL — SIGNIFICANT CHANGE UP (ref 32–36)
MCV RBC AUTO: 89.2 FL — SIGNIFICANT CHANGE UP (ref 80–100)
NITRITE UR-MCNC: NEGATIVE — SIGNIFICANT CHANGE UP
NRBC # BLD: 0 /100 WBCS — SIGNIFICANT CHANGE UP
NRBC # FLD: 0 K/UL — SIGNIFICANT CHANGE UP
PH UR: 6.5 — SIGNIFICANT CHANGE UP (ref 5–8)
PLATELET # BLD AUTO: 266 K/UL — SIGNIFICANT CHANGE UP (ref 150–400)
PROT UR-MCNC: NEGATIVE — SIGNIFICANT CHANGE UP
PROTHROM AB SERPL-ACNC: 11.3 SEC — SIGNIFICANT CHANGE UP (ref 10.5–13.4)
RBC # BLD: 3.72 M/UL — LOW (ref 3.8–5.2)
RBC # FLD: 13.9 % — SIGNIFICANT CHANGE UP (ref 10.3–14.5)
RBC CASTS # UR COMP ASSIST: 2 /HPF — SIGNIFICANT CHANGE UP (ref 0–4)
SP GR SPEC: 1.01 — SIGNIFICANT CHANGE UP (ref 1–1.05)
UROBILINOGEN FLD QL: SIGNIFICANT CHANGE UP
WBC # BLD: 10.29 K/UL — SIGNIFICANT CHANGE UP (ref 3.8–10.5)
WBC # FLD AUTO: 10.29 K/UL — SIGNIFICANT CHANGE UP (ref 3.8–10.5)
WBC UR QL: 27 /HPF — HIGH (ref 0–5)

## 2022-08-03 PROCEDURE — 73600 X-RAY EXAM OF ANKLE: CPT | Mod: 26,50

## 2022-08-03 NOTE — OB PROVIDER TRIAGE NOTE - HISTORY OF PRESENT ILLNESS
36 yo AMA obese  @ 30.1 wks c/o fall outside rolled left ankle and fell on left side and right knee. received via EMS, left kneee wrapped i gauze. pt stood up frm stretcher and ambulated without difficulty on both feet and legs. denies vb lo contractions + GFM. AP course complicated by GDMA1 enlarged fetal left kidney <right. denies fever chills ha dysuria n/v new swelling vision changes epigastric pain cp sob or cough. On Arrival arrival pt reports hasnt felt fetal movement in 40 minutes.    meds: PNV ASA  ALL denies  PMH: denies  PSH: denies  gyn hx: denies  ob hx:  10/21/2020 @ 41 wks IOL PP PEC s/p magnesium 7#2 38 yo AMA obese  @ 30.1 wks c/o fall outside rolled left ankle and fell on left side and right knee. received via EMS, left kneee wrapped i gauze. pt stood up frm stretcher and ambulated without difficulty on both feet and legs. denies vb lo contractions + GFM. AP course complicated by GDMA1 enlarged fetal left kidney <right. denies fever chills ha dysuria n/v new swelling vision changes epigastric pain cp sob or cough. On Arrival arrival pt reports hasnt felt fetal movement in 40 minutes. pt reports followed with US for fetal kidney right> than left.    meds: PNV ASA  ALL denies  PMH: denies  PSH: denies  gyn hx: denies  ob hx:  10/21/2020 @ 41 wks IOL PP PEC s/p magnesium 7#2

## 2022-08-03 NOTE — OB PROVIDER TRIAGE NOTE - NSHPPHYSICALEXAM_GEN_ALL_CORE
abd soft gravid obese NT, no pain  right knee- abrasion no active bleeding- gauze in place- removed bacitracin applied and new gauze.  left ankle + weight bearing, + swelling outer malleolus, no bruising or crepitus- ice pack applied  CV RRR  LS clear bilaterally abd soft gravid obese NT, no pain  right knee- abrasion no active bleeding- gauze in place- removed bacitracin applied and new gauze.  left ankle + weight bearing, + swelling outer malleolus, no bruising or crepitus- ice pack applied with relief  CV RRR  LS clear bilaterally  TAS: vertex  anterior placenta  BPP 8/8  KENDALL: 13.6  EFW: 1276g/2#13  SSE: cx appears closed, no pooling or blood observed  TVS: CL: 4.2- 4.4 no funneling or dynamic changes  FHT: moderate variability, + acceleration, negative decelerations  toco: none no pain  Vital Signs Last 24 Hrs  T(C): 36.9 (02 Aug 2022 23:07), Max: 36.9 (02 Aug 2022 23:07)  T(F): 98.4 (02 Aug 2022 23:07), Max: 98.4 (02 Aug 2022 23:07)  HR: 76 (03 Aug 2022 02:26) (74 - 89)  BP: 111/60 (03 Aug 2022 04:12) (111/60 - 134/98)  BP(mean): --  RR: 15 (02 Aug 2022 23:07) (15 - 18)  SpO2: 98% (02 Aug 2022 22:45) (98% - 98%)    Parameters below as of 02 Aug 2022 23:07  Patient On (Oxygen Delivery Method): room air abd soft gravid obese NT, no pain  right knee- abrasion no active bleeding- gauze in place- removed bacitracin applied and new gauze.  left ankle + weight bearing, + swelling outer malleolus, no bruising or crepitus- ice pack applied with relief  CV RRR  LS clear bilaterally  TAS: vertex  anterior placenta  BPP 8/8  KENDALL: 13.6  EFW: 1276g/2#13  SSE: cx appears closed, no pooling or blood observed  TVS: CL: 4.2- 4.4 no funneling or dynamic changes  FHT: moderate variability, + acceleration, negative decelerations, one variable  toco: none no pain  Vital Signs Last 24 Hrs  T(C): 36.9 (02 Aug 2022 23:07), Max: 36.9 (02 Aug 2022 23:07)  T(F): 98.4 (02 Aug 2022 23:07), Max: 98.4 (02 Aug 2022 23:07)  HR: 76 (03 Aug 2022 02:26) (74 - 89)  BP: 111/60 (03 Aug 2022 04:12) (111/60 - 134/98)  BP(mean): --  RR: 15 (02 Aug 2022 23:07) (15 - 18)  SpO2: 98% (02 Aug 2022 22:45) (98% - 98%)    Parameters below as of 02 Aug 2022 23:07  Patient On (Oxygen Delivery Method): room air

## 2022-08-03 NOTE — OB PROVIDER TRIAGE NOTE - PLAN OF CARE
d/w Dr Ojeda d/c home at 30.1 wks s/p Fall no evidence of PTL or abruption  make take tylenol for discomfort  ice packs to left ankle for comfort  bacitracin to right knee  maternal and fetal surveillance reassuring  rest activity as tolerated  increase water intake   labor precautions instructed  keep all OB appointments  may use maternity band for abdominal support while standing  return for vaginal bleeding, leakage of fluid, decreased fetal movement or any concerns  v/w discharge instructions given with verbal understanding by patient

## 2022-08-03 NOTE — OB PROVIDER TRIAGE NOTE - NSOBPROVIDERNOTE_OBGYN_ALL_OB_FT
NST x 4 hrs 3am end [ ]  bilateral ankle xray 2/2 left ankle s/p fall and pain  ice pack to left ankle  bacitracin to right knee NST x 4 hrs 3am end [ ]  bilateral ankle xray 2/2 left ankle s/p fall and pain  ice pack to left ankle  bacitracin to right knee    d/w Dr Ojeda d/c home at 30.1 wks s/p Fall no evidence of PTL or abruption  make take tylenol for discomfort  ice packs to left ankle for comfort  bacitracin to right knee  maternal and fetal surveillance reassuring  rest activity as tolerated  increase water intake   labor precautions instructed  keep all OB appointments  may use maternity band for abdominal support while standing  return for vaginal bleeding, leakage of fluid, decreased fetal movement or any concerns  v/w discharge instructions given with verbal understanding by patient NST x 4 hrs 3am end [ ]  bilateral ankle xray 2/2 left ankle s/p fall and pain  ice pack to left ankle  bacitracin to right knee  Dr Ojeda approved FHT  d/w Dr Ojeda d/c home at 30.1 wks s/p Fall no evidence of PTL or abruption  make take tylenol for discomfort  ice packs to left ankle for comfort  bacitracin to right knee  maternal and fetal surveillance reassuring  rest activity as tolerated  increase water intake   labor precautions instructed  keep all OB appointments  may use maternity band for abdominal support while standing  return for vaginal bleeding, leakage of fluid, decreased fetal movement or any concerns  v/w discharge instructions given with verbal understanding by patient

## 2022-08-03 NOTE — OB PROVIDER TRIAGE NOTE - NSHPLABSRESULTS_GEN_ALL_CORE
11.0   10.29 )-----------( 266      ( 02 Aug 2022 23:53 )             33.2   PT/INR - ( 02 Aug 2022 23:53 )   PT: 11.3 sec;   INR: 0.97 ratio         PTT - ( 02 Aug 2022 23:53 )  PTT:29.1 sec  Urinalysis Basic - ( 02 Aug 2022 23:55 )    Color: Light Yellow / Appearance: Slightly Turbid / S.014 / pH: x  Gluc: x / Ketone: Negative  / Bili: Negative / Urobili: <2 mg/dL   Blood: x / Protein: Negative / Nitrite: Negative   Leuk Esterase: Large / RBC: 2 /HPF / WBC 27 /HPF   Sq Epi: x / Non Sq Epi: Moderate / Bacteria: Moderate    urine cx sent  o+ blood type    xray bilat ankle: prelim no acute fracture

## 2022-08-04 ENCOUNTER — APPOINTMENT (OUTPATIENT)
Dept: ORTHOPEDIC SURGERY | Facility: CLINIC | Age: 37
End: 2022-08-04

## 2022-08-04 DIAGNOSIS — Z78.9 OTHER SPECIFIED HEALTH STATUS: ICD-10-CM

## 2022-08-04 DIAGNOSIS — S93.492A SPRAIN OF OTHER LIGAMENT OF LEFT ANKLE, INITIAL ENCOUNTER: ICD-10-CM

## 2022-08-04 LAB
CULTURE RESULTS: SIGNIFICANT CHANGE UP
SPECIMEN SOURCE: SIGNIFICANT CHANGE UP

## 2022-08-04 PROCEDURE — L4350: CPT

## 2022-08-04 PROCEDURE — 99203 OFFICE O/P NEW LOW 30 MIN: CPT

## 2022-08-04 NOTE — HISTORY OF PRESENT ILLNESS
[8] : 8 [6] : 6 [Sharp] : sharp [Rest] : rest [Standing] : standing [Walking] : walking [de-identified] : 8/4/22: fall 2 days ago w/ ankle pain. went to ED. no prior sig ankle probs. no dm/tob. 30 weeks pregnant.  -working from home [] : no [FreeTextEntry1] : left ankle [de-identified] : xray [de-identified] : [facility] [de-identified] : 8/2/2022 [de-identified] : xrays

## 2022-08-04 NOTE — DATA REVIEWED
[Outside X-rays] : outside x-rays [Left] : left [Foot] : foot [I reviewed the films/CD and additionally noted] : I reviewed the films/CD and additionally noted [FreeTextEntry1] : no acute fx -- [facility] - limited 2 view study

## 2022-09-18 ENCOUNTER — OUTPATIENT (OUTPATIENT)
Dept: OUTPATIENT SERVICES | Facility: HOSPITAL | Age: 37
LOS: 1 days | Discharge: HOME | End: 2022-09-18

## 2022-09-18 VITALS — DIASTOLIC BLOOD PRESSURE: 77 MMHG | HEART RATE: 88 BPM | SYSTOLIC BLOOD PRESSURE: 137 MMHG

## 2022-09-18 VITALS
HEART RATE: 86 BPM | OXYGEN SATURATION: 99 % | TEMPERATURE: 98 F | SYSTOLIC BLOOD PRESSURE: 138 MMHG | RESPIRATION RATE: 16 BRPM | DIASTOLIC BLOOD PRESSURE: 79 MMHG

## 2022-09-18 DIAGNOSIS — O26.899 OTHER SPECIFIED PREGNANCY RELATED CONDITIONS, UNSPECIFIED TRIMESTER: ICD-10-CM

## 2022-09-18 DIAGNOSIS — Z3A.00 WEEKS OF GESTATION OF PREGNANCY NOT SPECIFIED: ICD-10-CM

## 2022-09-18 PROCEDURE — 76818 FETAL BIOPHYS PROFILE W/NST: CPT | Mod: 26

## 2022-09-18 PROCEDURE — 99213 OFFICE O/P EST LOW 20 MIN: CPT | Mod: 25

## 2022-09-18 NOTE — OB PROVIDER TRIAGE NOTE - NSOBPROVIDERNOTE_OBGYN_ALL_OB_FT
36yo female  @ 36.6 wks SLIUP uncomp PNC here complaining of decreased FM since 12:30a 38yo female  @ 36.6 wks SLIUP uncomp PNC here complaining of decreased FM since 12:30p  -pt reports GFM now  -NST and TAS are 10/10  -pt with scheduled appt with Dr Rios for Wed 9/21  -pt was dw Dr Lee and pt was cleared for discharge home

## 2022-09-18 NOTE — OB RN TRIAGE NOTE - FALL HARM RISK - RISK INTERVENTIONS

## 2022-09-18 NOTE — OB PROVIDER TRIAGE NOTE - NSHPPHYSICALEXAM_GEN_ALL_CORE
Vital Signs Last 24 Hrs  T(C): 36.7 (18 Sep 2022 16:14), Max: 36.7 (18 Sep 2022 16:14)  T(F): 98.1 (18 Sep 2022 16:14), Max: 98.1 (18 Sep 2022 16:14)  HR: 88 (18 Sep 2022 16:35) (86 - 88)  BP: 137/77 (18 Sep 2022 16:35) (137/77 - 138/79)  BP(mean): --  ABP: --  ABP(mean): --  RR: 16 (18 Sep 2022 16:14) (16 - 16)  SpO2: 99% (18 Sep 2022 16:14) (99% - 99%)    Gen: A&O x 3; NAD    Pulm-CTA B/L; no wheezes  Cor-clear S1S2  Abd exam-soft and nontender    NST cat I with accels and mod variability; no ctx's    TAS-vtx; fundal placenta; KENDALL-17.0; 8/8. Pt reports feeling and visualizing FM

## 2022-09-18 NOTE — OB PROVIDER TRIAGE NOTE - HISTORY OF PRESENT ILLNESS
36yo female  @ 36.6 wks SLIUP uncomp PNC here complaining of decreased FM since 12:30 am this morning. Pt is intact and denies any LOF/ VB.    Pmhx-hypothyroidism dx w/ preg  Pshx/Hosp- denies   Meds-PNV; levothyroxin  NKDA  Past ob-10/21/2020-7#2  FT  Gyn- denies  Soc- denies 36yo female  @ 36.6 wks SLIUP uncomp PNC here complaining of decreased FM since 12:30pm this afternoon. Pt is intact and denies any LOF/ VB.    Pmhx-hypothyroidism dx w/ preg  Pshx/Hosp- denies   Meds-PNV; levothyroxin  NKDA  Past ob-10/21/2020-7#2  FT  Gyn- denies  Soc- denies

## 2022-09-27 ENCOUNTER — NON-APPOINTMENT (OUTPATIENT)
Age: 37
End: 2022-09-27

## 2022-09-29 ENCOUNTER — INPATIENT (INPATIENT)
Facility: HOSPITAL | Age: 37
LOS: 1 days | Discharge: HOME | End: 2022-10-01
Attending: SPECIALIST | Admitting: SPECIALIST

## 2022-09-29 VITALS
RESPIRATION RATE: 16 BRPM | HEART RATE: 85 BPM | SYSTOLIC BLOOD PRESSURE: 133 MMHG | TEMPERATURE: 98 F | DIASTOLIC BLOOD PRESSURE: 60 MMHG

## 2022-09-29 DIAGNOSIS — Z3A.00 WEEKS OF GESTATION OF PREGNANCY NOT SPECIFIED: ICD-10-CM

## 2022-09-29 DIAGNOSIS — O26.899 OTHER SPECIFIED PREGNANCY RELATED CONDITIONS, UNSPECIFIED TRIMESTER: ICD-10-CM

## 2022-09-29 LAB
APPEARANCE UR: CLEAR — SIGNIFICANT CHANGE UP
BASOPHILS # BLD AUTO: 0.04 K/UL — SIGNIFICANT CHANGE UP (ref 0–0.2)
BASOPHILS NFR BLD AUTO: 0.4 % — SIGNIFICANT CHANGE UP (ref 0–2)
BILIRUB UR-MCNC: NEGATIVE — SIGNIFICANT CHANGE UP
BLD GP AB SCN SERPL QL: NEGATIVE — SIGNIFICANT CHANGE UP
COLOR SPEC: YELLOW — SIGNIFICANT CHANGE UP
DIFF PNL FLD: NEGATIVE — SIGNIFICANT CHANGE UP
EOSINOPHIL # BLD AUTO: 0.11 K/UL — SIGNIFICANT CHANGE UP (ref 0–0.5)
EOSINOPHIL NFR BLD AUTO: 1.1 % — SIGNIFICANT CHANGE UP (ref 0–6)
EPI CELLS # UR: 5 /HPF — SIGNIFICANT CHANGE UP (ref 0–5)
GLUCOSE BLDC GLUCOMTR-MCNC: 100 MG/DL — HIGH (ref 70–99)
GLUCOSE BLDC GLUCOMTR-MCNC: 81 MG/DL — SIGNIFICANT CHANGE UP (ref 70–99)
GLUCOSE UR QL: NEGATIVE — SIGNIFICANT CHANGE UP
HCT VFR BLD CALC: 42.9 % — SIGNIFICANT CHANGE UP (ref 34.5–45)
HGB BLD-MCNC: 13.9 G/DL — SIGNIFICANT CHANGE UP (ref 11.5–15.5)
IANC: 6.52 K/UL — SIGNIFICANT CHANGE UP (ref 1.8–7.4)
IMM GRANULOCYTES NFR BLD AUTO: 0.4 % — SIGNIFICANT CHANGE UP (ref 0–0.9)
KETONES UR-MCNC: NEGATIVE — SIGNIFICANT CHANGE UP
LEUKOCYTE ESTERASE UR-ACNC: ABNORMAL
LYMPHOCYTES # BLD AUTO: 2.51 K/UL — SIGNIFICANT CHANGE UP (ref 1–3.3)
LYMPHOCYTES # BLD AUTO: 25.8 % — SIGNIFICANT CHANGE UP (ref 13–44)
MCHC RBC-ENTMCNC: 28.5 PG — SIGNIFICANT CHANGE UP (ref 27–34)
MCHC RBC-ENTMCNC: 32.4 GM/DL — SIGNIFICANT CHANGE UP (ref 32–36)
MCV RBC AUTO: 88.1 FL — SIGNIFICANT CHANGE UP (ref 80–100)
MONOCYTES # BLD AUTO: 0.5 K/UL — SIGNIFICANT CHANGE UP (ref 0–0.9)
MONOCYTES NFR BLD AUTO: 5.1 % — SIGNIFICANT CHANGE UP (ref 2–14)
NEUTROPHILS # BLD AUTO: 6.52 K/UL — SIGNIFICANT CHANGE UP (ref 1.8–7.4)
NEUTROPHILS NFR BLD AUTO: 67.2 % — SIGNIFICANT CHANGE UP (ref 43–77)
NITRITE UR-MCNC: NEGATIVE — SIGNIFICANT CHANGE UP
NRBC # BLD: 0 /100 WBCS — SIGNIFICANT CHANGE UP (ref 0–0)
NRBC # FLD: 0 K/UL — SIGNIFICANT CHANGE UP (ref 0–0)
PH UR: 6.5 — SIGNIFICANT CHANGE UP (ref 5–8)
PLATELET # BLD AUTO: 303 K/UL — SIGNIFICANT CHANGE UP (ref 150–400)
PROT UR-MCNC: ABNORMAL
RBC # BLD: 4.87 M/UL — SIGNIFICANT CHANGE UP (ref 3.8–5.2)
RBC # FLD: 15 % — HIGH (ref 10.3–14.5)
RBC CASTS # UR COMP ASSIST: 2 /HPF — SIGNIFICANT CHANGE UP (ref 0–4)
RH IG SCN BLD-IMP: POSITIVE — SIGNIFICANT CHANGE UP
SP GR SPEC: 1.03 — SIGNIFICANT CHANGE UP (ref 1.01–1.05)
UROBILINOGEN FLD QL: SIGNIFICANT CHANGE UP
WBC # BLD: 9.72 K/UL — SIGNIFICANT CHANGE UP (ref 3.8–10.5)
WBC # FLD AUTO: 9.72 K/UL — SIGNIFICANT CHANGE UP (ref 3.8–10.5)
WBC UR QL: 15 /HPF — HIGH (ref 0–5)

## 2022-09-29 RX ORDER — CHLORHEXIDINE GLUCONATE 213 G/1000ML
1 SOLUTION TOPICAL ONCE
Refills: 0 | Status: DISCONTINUED | OUTPATIENT
Start: 2022-09-29 | End: 2022-09-30

## 2022-09-29 RX ORDER — CITRIC ACID/SODIUM CITRATE 300-500 MG
15 SOLUTION, ORAL ORAL EVERY 6 HOURS
Refills: 0 | Status: DISCONTINUED | OUTPATIENT
Start: 2022-09-29 | End: 2022-09-30

## 2022-09-29 RX ORDER — OXYTOCIN 10 UNIT/ML
333.33 VIAL (ML) INJECTION
Qty: 20 | Refills: 0 | Status: COMPLETED | OUTPATIENT
Start: 2022-09-29 | End: 2022-09-29

## 2022-09-29 RX ORDER — SODIUM CHLORIDE 9 MG/ML
1000 INJECTION, SOLUTION INTRAVENOUS
Refills: 0 | Status: DISCONTINUED | OUTPATIENT
Start: 2022-09-29 | End: 2022-09-30

## 2022-09-29 RX ORDER — SODIUM CHLORIDE 9 MG/ML
1000 INJECTION INTRAMUSCULAR; INTRAVENOUS; SUBCUTANEOUS
Refills: 0 | Status: DISCONTINUED | OUTPATIENT
Start: 2022-09-29 | End: 2022-09-30

## 2022-09-29 RX ORDER — OXYTOCIN 10 UNIT/ML
2 VIAL (ML) INJECTION
Qty: 30 | Refills: 0 | Status: DISCONTINUED | OUTPATIENT
Start: 2022-09-29 | End: 2022-10-01

## 2022-09-29 RX ADMIN — SODIUM CHLORIDE 125 MILLILITER(S): 9 INJECTION, SOLUTION INTRAVENOUS at 16:44

## 2022-09-29 RX ADMIN — Medication 2 MILLIUNIT(S)/MIN: at 17:09

## 2022-09-29 RX ADMIN — SODIUM CHLORIDE 125 MILLILITER(S): 9 INJECTION INTRAMUSCULAR; INTRAVENOUS; SUBCUTANEOUS at 16:44

## 2022-09-29 NOTE — OB PROVIDER H&P - ASSESSMENT
Ms. JESSICA MENON is a 37y  38w3d presenting with decreased fetal movement since 01:00 last night and pelvic constant cramping since 11:00 this morning c/w labor. GBS negative (09/15/22). 2835gm    SVE No erythema, edema, lesions, blood, or leaking fluid at external genitalia. 4 / 70 / -3   NST: 40bpm. Moderate variability. Category 1. Tocometer with uterine irritability, no contractions.   TAUS: Vertex. Posterior placenta. BPP 8/8. KENDALL 6.98.     Plan  - Patient to be admitted for augmentation of labor with Pitocin for labor and decreased fetal movement  - Patient and partner informed of plan and demonstrate understanding. All questions answered. Consents signed. Covid PCR test obtained yesterday, patient with results and will email to [facility] records.  - Dr. Maddox made aware     Plan d/w Dr. Ewing

## 2022-09-29 NOTE — OB RN TRIAGE NOTE - FALL HARM RISK - RISK INTERVENTIONS

## 2022-09-29 NOTE — OB PROVIDER TRIAGE NOTE - HISTORY OF PRESENT ILLNESS
Ms. JESSICA MENON is a 37y  38w3d presenting with decreased fetal movement since 01:00 last night. Scheduled risk-reducing induction for 10/01/22 for "borderline GDM," history of PP preeclampsia, and fetus with pyelectasis. Admits to constant pelvic cramping since 11AM, 8/10, has not taken anything yet for it. Denies vaginal bleeding, leaking of fluid.      PNC: Has been monitored for HTN due to history of PP PEC without any new diagnoses with current pregnancy. Patient with "borderline GDM," although more c/w GDMA1. Fetus with pyelectasis  Pt reports no hospitalizations, procedures, infections, or new diagnoses in pregnancy.     No Known Allergies    OBHx:   - 10/21/20  approx 40w 7#2, complicated by PP PEC  GynHx:  - Denies   PMH:   - History of hypothyroidism with previous pregnancy that has since resolved, per patient    PSH:   - Denies   Psych:   - Denies   Social:   - Denies   Meds:   - PNV  - Baby aspirin    Ms. JESSICA MENON is a 37y  38w3d presenting with decreased fetal movement since 01:00 last night. Scheduled risk-reducing induction for 10/01/22 for "borderline GDM," history of PP preeclampsia, and fetus with pyelectasis, per patient. Admits to constant pelvic cramping since 11AM, 8/10, has not taken anything yet for it. Denies vaginal bleeding, leaking of fluid.      PNC: Has been monitored for HTN due to history of PP PEC without any new diagnoses with current pregnancy. Patient with "borderline GDM." Fetus with pyelectasis  Pt reports no hospitalizations, procedures, infections, or new diagnoses in pregnancy.     No Known Allergies    OBHx:   - 10/21/20  approx 40w 7#2, complicated by PP PEC  GynHx:  - Denies   PMH:   - History of hypothyroidism with previous pregnancy that has since resolved, per patient    PSH:   - Denies   Psych:   - Denies   Social:   - Denies   Meds:   - PNV  - Baby aspirin

## 2022-09-29 NOTE — OB PROVIDER H&P - HISTORY OF PRESENT ILLNESS
Ms. JESSICA MENON is a 37y  38w3d presenting with decreased fetal movement since 01:00 last night. Scheduled risk-reducing induction for 10/01/22 for "borderline GDM," history of PP preeclampsia, and fetus with pyelectasis, per patient. Admits to constant pelvic cramping since 11AM, 8/10, has not taken anything yet for it. Denies vaginal bleeding, leaking of fluid.      PNC: Has been monitored for HTN due to history of PP PEC without any new diagnoses with current pregnancy. Patient with "borderline GDM." Fetus with pyelectasis  Pt reports no hospitalizations, procedures, infections, or new diagnoses in pregnancy.     No Known Allergies    OBHx:   - 10/21/20  approx 40w 7#2, complicated by PP PEC  GynHx:  - Denies   PMH:   - History of hypothyroidism with previous pregnancy that has since resolved, per patient    PSH:   - Denies   Psych:   - Denies   Social:   - Denies   Meds:   - PNV  - Baby aspirin

## 2022-09-29 NOTE — OB PROVIDER H&P - NSHPPHYSICALEXAM_GEN_ALL_CORE
T(C): 36.7 (09-29-22 @ 12:53), Max: 36.7 (09-29-22 @ 12:53)  HR: 85 (09-29-22 @ 13:12) (85 - 85)  BP: 133/60 (09-29-22 @ 13:12) (133/60 - 133/60)  RR: 16 (09-29-22 @ 12:53) (16 - 16)  SpO2: --    General: Female sitting comfortably in no apparent distress   Head: Normocephalic. Atraumatic.   Eyes: No discharge, lids normal, conjunctiva normal  Lungs: No resp distress  Abdomen: Soft, nontender. Gravid. No guarding, rebound, rigidity. No CVAT.   SVE: No erythema, edema, lesions, blood, or leaking fluid at external genitalia. 4 / 70 / -3   NST: 140bpm. Moderate variability. Category 1. Tocometer with uterine irritability, no contractions.   TAUS: Vertex. Posterior placenta. BPP 8/8. KENDALL 6.98.   Neuro: No facial asymmetry, no slurred speech, moves all 4 extremities  Mood: Alert and lucid, appropriate mood and affect

## 2022-09-29 NOTE — OB PROVIDER TRIAGE NOTE - NSHPPHYSICALEXAM_GEN_ALL_CORE
T(C): 36.7 (09-29-22 @ 12:53), Max: 36.7 (09-29-22 @ 12:53)  HR: 85 (09-29-22 @ 13:12) (85 - 85)  BP: 133/60 (09-29-22 @ 13:12) (133/60 - 133/60)  RR: 16 (09-29-22 @ 12:53) (16 - 16)  SpO2: --    General: Female sitting comfortably in no apparent distress   Head: Normocephalic. Atraumatic.   Eyes: No discharge, lids normal, conjunctiva normal  Lungs: No resp distress  Abdomen: Soft, nontender. Gravid. No CVAT   SSE: No erythema, edema, lesions, blood, or leaking fluid at external genitalia. Cervix open / closed. Blood absent / present. Discharge  SVE: No erythema, edema, lesions, blood, or leaking fluid at external genitalia.   NST: bpm. Variability. Category. Tocometer   TAUS: Vertex. Done to confirm presentation   Neuro: No facial asymmetry, no slurred speech, moves all 4 extremities  Mood: Alert and lucid, appropriate mood and affect T(C): 36.7 (09-29-22 @ 12:53), Max: 36.7 (09-29-22 @ 12:53)  HR: 85 (09-29-22 @ 13:12) (85 - 85)  BP: 133/60 (09-29-22 @ 13:12) (133/60 - 133/60)  RR: 16 (09-29-22 @ 12:53) (16 - 16)  SpO2: --    General: Female sitting comfortably in no apparent distress   Head: Normocephalic. Atraumatic.   Eyes: No discharge, lids normal, conjunctiva normal  Lungs: No resp distress  Abdomen: Soft, nontender. Gravid. No guarding, rebound, rigidity. No CVAT.   SVE: No erythema, edema, lesions, blood, or leaking fluid at external genitalia. 4 / 70 / -3   NST: 140bpm. Moderate variability. Category 1. Tocometer with uterine irritability, no contractions.   TAUS: Vertex. Posterior placenta. BPP 8/8. KENDALL 6.98.   Neuro: No facial asymmetry, no slurred speech, moves all 4 extremities  Mood: Alert and lucid, appropriate mood and affect

## 2022-09-29 NOTE — OB PROVIDER TRIAGE NOTE - NSOBPROVIDERNOTE_OBGYN_ALL_OB_FT
Ms. JESSICA MENON is a 37y  38w3d presenting with decreased fetal movement since 01:00 last night and pelvic constant cramping since 11:00 this morning.     Plan  - Pending NST, BPP, UA Ms. JESSICA MENON is a 37y  38w3d presenting with decreased fetal movement since 01:00 last night and pelvic constant cramping since 11:00 this morning c/w labor. GBS (E No erythema, edema, lesions, blood, or leaking fluid at external genitalia.  / -3   NST: 40bpm. Moderate variability. Category 1. Tocometer with uterine irritability, no contractions.   TAUS: Vertex. Posterior placenta. BPP 8/8. KENDALL 6.98.     Plan  - Patient to be admitted for risk reducing induction of labor with Pitocin for decreased fetal movement    Plan d/w Dr. Ewing Ms. JESSICA MENON is a 37y  38w3d presenting with decreased fetal movement since 01:00 last night and pelvic constant cramping since 11:00 this morning c/w labor. GBS negative (09/15/22)    SVE No erythema, edema, lesions, blood, or leaking fluid at external genitalia.  / -3   NST: 40bpm. Moderate variability. Category 1. Tocometer with uterine irritability, no contractions.   TAUS: Vertex. Posterior placenta. BPP 8/8. KENDALL 6.98.     Plan  - Patient to be admitted for augmentation of labor with Pitocin for labor and decreased fetal movement  - Patient and partner informed of plan and demonstrate understanding. All questions answered. Consents signed. Covid PCR test obtained yesterday, patient with results and will email to [facility] records.    Plan d/w Dr. Ewing

## 2022-09-29 NOTE — OB RN PATIENT PROFILE - NSICDXPASTMEDICALHX_GEN_ALL_CORE_FT
PAST MEDICAL HISTORY:  2019 novel coronavirus disease (COVID-19)     Hypothyroidism dx during preg      (normal spontaneous vaginal delivery) 10/21/2020 - male 7#2

## 2022-09-29 NOTE — OB RN PATIENT PROFILE - FALL HARM RISK - RISK INTERVENTIONS

## 2022-09-30 ENCOUNTER — TRANSCRIPTION ENCOUNTER (OUTPATIENT)
Age: 37
End: 2022-09-30

## 2022-09-30 LAB
COVID-19 SPIKE DOMAIN AB INTERP: POSITIVE
COVID-19 SPIKE DOMAIN ANTIBODY RESULT: >250 U/ML — HIGH
GLUCOSE BLDC GLUCOMTR-MCNC: 83 MG/DL — SIGNIFICANT CHANGE UP (ref 70–99)
SARS-COV-2 IGG+IGM SERPL QL IA: >250 U/ML — HIGH
SARS-COV-2 IGG+IGM SERPL QL IA: POSITIVE
T PALLIDUM AB TITR SER: NEGATIVE — SIGNIFICANT CHANGE UP

## 2022-09-30 RX ORDER — DIBUCAINE 1 %
1 OINTMENT (GRAM) RECTAL EVERY 6 HOURS
Refills: 0 | Status: DISCONTINUED | OUTPATIENT
Start: 2022-09-30 | End: 2022-10-01

## 2022-09-30 RX ORDER — ACETAMINOPHEN 500 MG
3 TABLET ORAL
Qty: 0 | Refills: 0 | DISCHARGE
Start: 2022-09-30

## 2022-09-30 RX ORDER — LANOLIN
1 OINTMENT (GRAM) TOPICAL EVERY 6 HOURS
Refills: 0 | Status: DISCONTINUED | OUTPATIENT
Start: 2022-09-30 | End: 2022-10-01

## 2022-09-30 RX ORDER — TETANUS TOXOID, REDUCED DIPHTHERIA TOXOID AND ACELLULAR PERTUSSIS VACCINE, ADSORBED 5; 2.5; 8; 8; 2.5 [IU]/.5ML; [IU]/.5ML; UG/.5ML; UG/.5ML; UG/.5ML
0.5 SUSPENSION INTRAMUSCULAR ONCE
Refills: 0 | Status: COMPLETED | OUTPATIENT
Start: 2022-09-30

## 2022-09-30 RX ORDER — IBUPROFEN 200 MG
1 TABLET ORAL
Qty: 0 | Refills: 0 | DISCHARGE
Start: 2022-09-30

## 2022-09-30 RX ORDER — PRAMOXINE HYDROCHLORIDE 150 MG/15G
1 AEROSOL, FOAM RECTAL EVERY 4 HOURS
Refills: 0 | Status: DISCONTINUED | OUTPATIENT
Start: 2022-09-30 | End: 2022-10-01

## 2022-09-30 RX ORDER — BENZOCAINE 10 %
1 GEL (GRAM) MUCOUS MEMBRANE EVERY 6 HOURS
Refills: 0 | Status: DISCONTINUED | OUTPATIENT
Start: 2022-09-30 | End: 2022-10-01

## 2022-09-30 RX ORDER — ASPIRIN/CALCIUM CARB/MAGNESIUM 324 MG
0 TABLET ORAL
Qty: 0 | Refills: 0 | DISCHARGE

## 2022-09-30 RX ORDER — AER TRAVELER 0.5 G/1
1 SOLUTION RECTAL; TOPICAL EVERY 4 HOURS
Refills: 0 | Status: DISCONTINUED | OUTPATIENT
Start: 2022-09-30 | End: 2022-10-01

## 2022-09-30 RX ORDER — IBUPROFEN 200 MG
600 TABLET ORAL EVERY 6 HOURS
Refills: 0 | Status: COMPLETED | OUTPATIENT
Start: 2022-09-30 | End: 2023-08-29

## 2022-09-30 RX ORDER — OXYTOCIN 10 UNIT/ML
333.33 VIAL (ML) INJECTION
Qty: 20 | Refills: 0 | Status: DISCONTINUED | OUTPATIENT
Start: 2022-09-30 | End: 2022-10-01

## 2022-09-30 RX ORDER — HYDROCORTISONE 1 %
1 OINTMENT (GRAM) TOPICAL EVERY 6 HOURS
Refills: 0 | Status: DISCONTINUED | OUTPATIENT
Start: 2022-09-30 | End: 2022-10-01

## 2022-09-30 RX ORDER — KETOROLAC TROMETHAMINE 30 MG/ML
30 SYRINGE (ML) INJECTION ONCE
Refills: 0 | Status: DISCONTINUED | OUTPATIENT
Start: 2022-09-30 | End: 2022-10-01

## 2022-09-30 RX ORDER — FAMOTIDINE 10 MG/ML
20 INJECTION INTRAVENOUS ONCE
Refills: 0 | Status: COMPLETED | OUTPATIENT
Start: 2022-09-30 | End: 2022-09-30

## 2022-09-30 RX ORDER — ACETAMINOPHEN 500 MG
975 TABLET ORAL
Refills: 0 | Status: DISCONTINUED | OUTPATIENT
Start: 2022-09-30 | End: 2022-10-01

## 2022-09-30 RX ORDER — SIMETHICONE 80 MG/1
80 TABLET, CHEWABLE ORAL EVERY 4 HOURS
Refills: 0 | Status: DISCONTINUED | OUTPATIENT
Start: 2022-09-30 | End: 2022-10-01

## 2022-09-30 RX ORDER — SODIUM CHLORIDE 9 MG/ML
3 INJECTION INTRAMUSCULAR; INTRAVENOUS; SUBCUTANEOUS EVERY 8 HOURS
Refills: 0 | Status: DISCONTINUED | OUTPATIENT
Start: 2022-09-30 | End: 2022-10-01

## 2022-09-30 RX ORDER — OXYCODONE HYDROCHLORIDE 5 MG/1
5 TABLET ORAL ONCE
Refills: 0 | Status: DISCONTINUED | OUTPATIENT
Start: 2022-09-30 | End: 2022-10-01

## 2022-09-30 RX ORDER — IBUPROFEN 200 MG
600 TABLET ORAL EVERY 6 HOURS
Refills: 0 | Status: DISCONTINUED | OUTPATIENT
Start: 2022-09-30 | End: 2022-10-01

## 2022-09-30 RX ORDER — OXYCODONE HYDROCHLORIDE 5 MG/1
5 TABLET ORAL
Refills: 0 | Status: DISCONTINUED | OUTPATIENT
Start: 2022-09-30 | End: 2022-10-01

## 2022-09-30 RX ORDER — DIPHENHYDRAMINE HCL 50 MG
25 CAPSULE ORAL EVERY 6 HOURS
Refills: 0 | Status: DISCONTINUED | OUTPATIENT
Start: 2022-09-30 | End: 2022-10-01

## 2022-09-30 RX ORDER — MAGNESIUM HYDROXIDE 400 MG/1
30 TABLET, CHEWABLE ORAL
Refills: 0 | Status: DISCONTINUED | OUTPATIENT
Start: 2022-09-30 | End: 2022-10-01

## 2022-09-30 RX ADMIN — Medication 975 MILLIGRAM(S): at 21:00

## 2022-09-30 RX ADMIN — Medication 975 MILLIGRAM(S): at 09:40

## 2022-09-30 RX ADMIN — Medication 975 MILLIGRAM(S): at 03:53

## 2022-09-30 RX ADMIN — SODIUM CHLORIDE 3 MILLILITER(S): 9 INJECTION INTRAMUSCULAR; INTRAVENOUS; SUBCUTANEOUS at 14:00

## 2022-09-30 RX ADMIN — Medication 975 MILLIGRAM(S): at 21:30

## 2022-09-30 RX ADMIN — MAGNESIUM HYDROXIDE 30 MILLILITER(S): 400 TABLET, CHEWABLE ORAL at 20:57

## 2022-09-30 RX ADMIN — Medication 600 MILLIGRAM(S): at 18:20

## 2022-09-30 RX ADMIN — Medication 600 MILLIGRAM(S): at 18:50

## 2022-09-30 RX ADMIN — Medication 1 TABLET(S): at 12:49

## 2022-09-30 RX ADMIN — Medication 1000 MILLIUNIT(S)/MIN: at 03:31

## 2022-09-30 RX ADMIN — FAMOTIDINE 20 MILLIGRAM(S): 10 INJECTION INTRAVENOUS at 01:22

## 2022-09-30 RX ADMIN — Medication 975 MILLIGRAM(S): at 10:10

## 2022-09-30 RX ADMIN — SODIUM CHLORIDE 3 MILLILITER(S): 9 INJECTION INTRAMUSCULAR; INTRAVENOUS; SUBCUTANEOUS at 06:00

## 2022-09-30 NOTE — DISCHARGE NOTE OB - CARE PLAN
1 Principal Discharge DX:	Normal spontaneous vaginal delivery  Assessment and plan of treatment:	REGULAR DIET  AMBULATION ENCOURAGED

## 2022-09-30 NOTE — DISCHARGE NOTE OB - BREAST MILK PROVIDES COLOSTRUM THAT IS HIGH IN PROTEIN
Gen: WNWD NAD  HEENT: NCAT PERRL EOMI normal pharynx  Neck: supple  CV: RRR, no murmur  Lung: CTA BL  Abd: +BS soft ND mild L sided abd TTP just lateral to umbilicus no grr  Ext: wwp, palp pulses, FROMx4, no cce  Neuro: A&Ox3, CN grossly intact, sensation intact, motor 5/5 throughout
Statement Selected

## 2022-09-30 NOTE — PROGRESS NOTE ADULT - SUBJECTIVE AND OBJECTIVE BOX
Post partum Day 1      Pt without complaints    Vital Signs Last 24 Hrs  T(C): 36.7 (30 Sep 2022 09:51), Max: 36.7 (30 Sep 2022 09:51)  T(F): 98.1 (30 Sep 2022 09:51), Max: 98.1 (30 Sep 2022 09:51)  HR: 82 (30 Sep 2022 09:51) (68 - 112)  BP: 123/77 (30 Sep 2022 09:51) (106/56 - 167/81)  BP(mean): --  RR: 18 (30 Sep 2022 09:51) (17 - 18)  SpO2: 100% (30 Sep 2022 09:51) (96% - 100%)    Parameters below as of 30 Sep 2022 09:51  Patient On (Oxygen Delivery Method): room air                            13.9   9.72  )-----------( 303      ( 29 Sep 2022 15:30 )             42.9     MEDICATIONS  (STANDING):  acetaminophen     Tablet .. 975 milliGRAM(s) Oral <User Schedule>  diphtheria/tetanus/pertussis (acellular) Vaccine (ADAcel) 0.5 milliLiter(s) IntraMuscular once  ibuprofen  Tablet. 600 milliGRAM(s) Oral every 6 hours  ketorolac   Injectable 30 milliGRAM(s) IV Push once  oxytocin Infusion 333.333 milliUNIT(s)/Min (1000 mL/Hr) IV Continuous <Continuous>  oxytocin Infusion. 2 milliUNIT(s)/Min (2 mL/Hr) IV Continuous <Continuous>  prenatal multivitamin 1 Tablet(s) Oral daily  sodium chloride 0.9% lock flush 3 milliLiter(s) IV Push every 8 hours    MEDICATIONS  (PRN):  benzocaine 20%/menthol 0.5% Spray 1 Spray(s) Topical every 6 hours PRN for Perineal discomfort  dibucaine 1% Ointment 1 Application(s) Topical every 6 hours PRN Perineal discomfort  diphenhydrAMINE 25 milliGRAM(s) Oral every 6 hours PRN Pruritus  hydrocortisone 1% Cream 1 Application(s) Topical every 6 hours PRN Moderate Pain (4-6)  lanolin Ointment 1 Application(s) Topical every 6 hours PRN nipple soreness  magnesium hydroxide Suspension 30 milliLiter(s) Oral two times a day PRN Constipation  oxyCODONE    IR 5 milliGRAM(s) Oral every 3 hours PRN Moderate to Severe Pain (4-10)  oxyCODONE    IR 5 milliGRAM(s) Oral once PRN Moderate to Severe Pain (4-10)  pramoxine 1%/zinc 5% Cream 1 Application(s) Topical every 4 hours PRN Moderate Pain (4-6)  simethicone 80 milliGRAM(s) Chew every 4 hours PRN Gas  witch hazel Pads 1 Application(s) Topical every 4 hours PRN Perineal discomfort      Abdomen soft  fundus firm, non tender  extremities non tender    lochia wnl      Patient doing well  Routine post partum care

## 2022-09-30 NOTE — DISCHARGE NOTE OB - PATIENT PORTAL LINK FT
You can access the FollowMyHealth Patient Portal offered by [facility] by registering at the following website: [email or web address]. By joining [facility]’s FollowMyHealth portal, you will also be able to view your health information using other applications (apps) compatible with our system.

## 2022-09-30 NOTE — OB PROVIDER DELIVERY SUMMARY - NSPROVIDERDELIVERYNOTE_OBGYN_ALL_OB_FT
Head delivered in OA. Nuchal x1 was noted. Subsequently with gentle downward guidance, the anterior shoulder was delivered followed by the posterior shoulder and remainder of the body without difficulty. Nuchal cord was easily reduced after delivery of the body.    Spontaneous cry. Infant passed to the mother. Cord clamping was delayed for 1 minute. Cord clamped and cut. Cord gasses obtained via a segment of cord. Infant to  nursery.    Placenta was delivered spontaneously intact. Uterine massage was performed and Oxytocin was given upon delivery of the placenta. Fundus noted to be firm. Cervix and rectum intact.    First degree laceration repaired w/ 2-0 chromic suture.    Adequate hemostasis. QBL: 100  Count correct x2.

## 2022-09-30 NOTE — OB RN DELIVERY SUMMARY - NSSELHIDDEN_OBGYN_ALL_OB_FT
Advance Directives: Care Instructions  Overview  An advance directive is a legal way to state your wishes at the end of your life. It tells your family and your doctor what to do if you can't say what you want. There are two main types of advance directives. You can change them any time your wishes change. Living will. This form tells your family and your doctor your wishes about life support and other treatment. The form is also called a declaration. Medical power of . This form lets you name a person to make treatment decisions for you when you can't speak for yourself. This person is called a health care agent (health care proxy, health care surrogate). The form is also called a durable power of  for health care. If you do not have an advance directive, decisions about your medical care may be made by a family member, or by a doctor or a  who doesn't know you. It may help to think of an advance directive as a gift to the people who care for you. If you have one, they won't have to make tough decisions by themselves. Follow-up care is a key part of your treatment and safety. Be sure to make and go to all appointments, and call your doctor if you are having problems. It's also a good idea to know your test results and keep a list of the medicines you take. What should you include in an advance directive? Many states have a unique advance directive form. (It may ask you to address specific issues.) Or you might use a universal form that's approved by many states. If your form doesn't tell you what to address, it may be hard to know what to include in your advance directive. Use the questions below to help you get started. · Who do you want to make decisions about your medical care if you are not able to? · What life-support measures do you want if you have a serious illness that gets worse over time or can't be cured? · What are you most afraid of that might happen? (Maybe you're afraid of having pain, losing your independence, or being kept alive by machines.)  · Where would you prefer to die? (Your home? A hospital? A nursing home?)  · Do you want to donate your organs when you die? · Do you want certain Christianity practices performed before you die? When should you call for help? Be sure to contact your doctor if you have any questions. Where can you learn more? Go to [email or web address] and sign in to your [employer] account. Enter R264 in the Health in Reach box to learn more about \"Advance Directives: Care Instructions. \"     If you do not have an account, please click on the \"Sign Up Now\" link. Current as of: December 9, 2019               Content Version: 12.5  © 0446-1544 Healthwise, Incorporated. Care instructions adapted under license by [facility] ([facility]). If you have questions about a medical condition or this instruction, always ask your healthcare professional. [place] [address] any warranty or liability for your use of this information. Learning About Medical Power of   What is a medical power of ? A medical power of , also called a durable power of  for health care, is one type of the legal forms called advance directives. It lets you name the person you want to make treatment decisions for you if you can't speak or decide for yourself. The person you choose is called your health care agent. This person is also called a health care proxy or health care surrogate. A medical power of  may be called something else in your state. How do you choose a health care agent? Choose your health care agent carefully. This person may or may not be a family member. Talk to the person before you make your final decision. Make sure he or she is comfortable with this responsibility. It's a good idea to choose someone who:  · Is at least 25years old.   · Knows you well and understands what makes life meaningful for you. · Understands your Orthodoxy and moral values. · Will do what you want, not what he or she wants. · Will be able to make difficult choices at a stressful time. · Will be able to refuse or stop treatment, if that is what you would want, even if you could die. · Will be firm and confident with health professionals if needed. · Will ask questions to get needed information. · Lives near you or agrees to travel to you if needed. Your family may help you make medical decisions while you can still be part of that process. But it's important to choose one person to be your health care agent in case you aren't able to make decisions for yourself. If you don't fill out the legal form and name a health care agent, the decisions your family can make may be limited. A health care agent may be called something else in your state. Who will make decisions for you if you don't have a health care agent? If you don't have a health care agent or a living will, you may not get the care you want. Decisions may be made by family members who disagree about your medical care. Or decisions may be made by a medical professional who doesn't know you well. In some cases, a  makes the decisions. When you name a health care agent, it is very clear who has the power to make health decisions for you. How do you name a health care agent? You name your health care agent on a legal form. This form is usually called a medical power of . Ask your hospital, state bar association, or office on aging where to find these forms. You must sign the form to make it legal. Some states require you to get the form notarized. This means that a person called a  watches you sign the form and then he or she signs the form. Some states also require that two or more witnesses sign the form. Be sure to tell your family members and doctors who your health care agent is.   Where can can't breathe on your own, your heart stops, or you can't swallow. · What things would you still want to be able to do after you receive life-support methods? Would you want to be able to walk? To speak? To eat on your own? To live without the help of machines? · Do you want certain Holiness practices performed if you become very ill? · If you have a choice, where do you want to be cared for? In your home? At a hospital or nursing home? · If you have a choice at the end of your life, where would you prefer to die? At home? In a hospital or nursing home? Somewhere else? · Would you prefer to be buried or cremated? · Do you want your organs to be donated after you die? What should you do with your living will? · Make sure that your family members and your health care agent have copies of your living will (also called a declaration). · Give your doctor a copy of your living will. Ask him or her to keep it as part of your medical record. If you have more than one doctor, make sure that each one has a copy. · Put a copy of your living will where it can be easily found. For example, some people may put a copy on their refrigerator door. If you are using a digital copy, be sure your doctor, family members, and health care agent know how to find and access it. Where can you learn more? Go to [email or web address] and sign in to your [employer] account. Enter W500 in the [facility] box to learn more about \"Learning About Living [place]. \"     If you do not have an account, please click on the \"Sign Up Now\" link. Current as of: December 9, 2019               Content Version: 12.5  © 5178-6948 Healthwise, Incorporated. Care instructions adapted under license by [facility] ([facility]).  If you have questions about a medical condition or this instruction, always ask your healthcare professional. [address] any warranty or liability for your use of this [NS_DeliveryAttending1_OBGYN_ALL_OB_FT:[id number]],[NS_DeliveryAssist1_OBGYN_ALL_OB_FT:[id number]],[NS_CirculateRN2_OBGYN_ALL_OB_FT:[id number]=]

## 2022-09-30 NOTE — DISCHARGE NOTE OB - NS MD DC FALL RISK RISK
For information on Fall & Injury Prevention, visit: [email or web address] OR  [email or web address] OR  https://www.cdc.gov/steadi/patient.html

## 2022-09-30 NOTE — OB RN DELIVERY SUMMARY - NS_SEPSISRSKCALC_OBGYN_ALL_OB_FT
EOS calculated successfully. EOS Risk Factor: 0.5/1000 live births ([facility] national incidence); GA=38w4d; Temp=98.1; ROM=8.05; GBS='Negative'; Antibiotics='No antibiotics or any antibiotics < 2 hrs prior to birth'

## 2022-09-30 NOTE — OB PROVIDER LABOR PROGRESS NOTE - NS_SUBJECTIVE/OBJECTIVE_OBGYN_ALL_OB_FT
Pt evaluated after feeling increased rectal pressure
Patient seen and examined for AROM. No complaints at this time. Patient declines epidural at this time.  VS  T(C): 36.4 (09-29-22 @ 16:24)  HR: 95 (09-29-22 @ 17:13)  BP: 121/73 (09-29-22 @ 17:13)  RR: 17 (09-29-22 @ 16:24)  SpO2: --
patient examined for cervical change

## 2022-09-30 NOTE — OB PROVIDER LABOR PROGRESS NOTE - ASSESSMENT
A/P: admitted in early labor    - no cervical change, IUPC placed  - continue titrating pitocin  - called for epidural    C. Diamant, PGY-4  d/w Dr. Grace
Pt has made cervical change. Labor down until patient feels urge to push.    Dr. Grace aware    Millie Sparks PGY1
@ 38.3 decFM, A1  AROM clear fluid  Continue with pitocin  Continue EFM/TOCO  Epidural for pain mgt PRN  Will reassess PRN      alma Burnett NP

## 2022-09-30 NOTE — OB PROVIDER DELIVERY SUMMARY - NSSELHIDDEN_OBGYN_ALL_OB_FT
[NS_DeliveryAttending1_OBGYN_ALL_OB_FT:[id number]],[NS_DeliveryAssist1_OBGYN_ALL_OB_FT:[id number]],[NS_CirculateRN2_OBGYN_ALL_OB_FT:[id number]=]

## 2022-09-30 NOTE — DISCHARGE NOTE OB - CARE PROVIDER_API CALL
Aguilar Rios)  Obstetrics and Gynecology  [address] [place] [address]  Phone: [phone]  Fax: [phone]  Follow Up Time: 1 month

## 2022-09-30 NOTE — DISCHARGE NOTE OB - MEDICATION SUMMARY - MEDICATIONS TO TAKE
I will START or STAY ON the medications listed below when I get home from the hospital:    acetaminophen 325 mg oral tablet  -- 3 tab(s) by mouth every 8 hours  -- Indication: For 2019 novel coronavirus disease (COVID-19)    ibuprofen 600 mg oral tablet  -- 1 tab(s) by mouth every 6 hours  -- Indication: For 2019 novel coronavirus disease (COVID-19)    PNV Prenatal oral tablet  -- 1 tab(s) by mouth once a day  -- Indication: For 2019 novel coronavirus disease (COVID-19)

## 2022-10-01 VITALS
DIASTOLIC BLOOD PRESSURE: 67 MMHG | HEART RATE: 76 BPM | RESPIRATION RATE: 18 BRPM | OXYGEN SATURATION: 100 % | SYSTOLIC BLOOD PRESSURE: 126 MMHG | TEMPERATURE: 98 F

## 2022-10-01 RX ORDER — TETANUS TOXOID, REDUCED DIPHTHERIA TOXOID AND ACELLULAR PERTUSSIS VACCINE, ADSORBED 5; 2.5; 8; 8; 2.5 [IU]/.5ML; [IU]/.5ML; UG/.5ML; UG/.5ML; UG/.5ML
0.5 SUSPENSION INTRAMUSCULAR ONCE
Refills: 0 | Status: COMPLETED | OUTPATIENT
Start: 2022-10-01 | End: 2022-10-01

## 2022-10-01 RX ORDER — FAMOTIDINE 10 MG/ML
20 INJECTION INTRAVENOUS ONCE
Refills: 0 | Status: COMPLETED | OUTPATIENT
Start: 2022-10-01 | End: 2022-10-01

## 2022-10-01 RX ADMIN — Medication 1 TABLET(S): at 09:54

## 2022-10-01 RX ADMIN — TETANUS TOXOID, REDUCED DIPHTHERIA TOXOID AND ACELLULAR PERTUSSIS VACCINE, ADSORBED 0.5 MILLILITER(S): 5; 2.5; 8; 8; 2.5 SUSPENSION INTRAMUSCULAR at 06:10

## 2022-10-01 RX ADMIN — FAMOTIDINE 20 MILLIGRAM(S): 10 INJECTION INTRAVENOUS at 02:16

## 2022-10-01 RX ADMIN — Medication 600 MILLIGRAM(S): at 09:54

## 2022-12-21 NOTE — OB RN DELIVERY SUMMARY - NS_RESUSCITPROC_OBGYN_ALL_OB
I will send in Macrobid. Next time we will need a UA prior to treatment.   
Notified patient of provider response. Patient states understanding.     Patient states she forgot to mention that in addition to possible yet infection she thinks she has a URI. She states she does have a the vaginal itching with a milky discharge but also is experiencing urinary frequency / urgency. She denies any pain or burning with urination, visible blood in the urine, difficult / decreased urination,  back pain abdominal pain, or fever. Patient is asking of Zack can prescribe antibiotic. She states she has been treated with Macrobid in the past. Explained that typically a UA is needed to confirm what bacteria is causing the infection. Patient is asking of she can be treated without UA as she is preparing to go out of town in the morning    She states she has had both UTI and yeast infection in the past.     Please advise.       
Patient has questions about her foot  
Patient notified of provider response. Patient states understanding.   
Prescriptions were sent to [facility] pharmacy this morning but now switched to [facility], including Lidocaine.     Percocet prescription can be picked up but not to be started until 12/28 which would have been her /start date. This will keep her on schedule with refills.   
Spoke with patient. She states she will be leaving to go to to [place] tomorrow for the Holiday. She states she will return on 1/3/22. She states home health is coming today to teach a family member how to change her foot dressing while they are away    Patient states she put a request for refill of her percocet, and a prescription for diflucan yesterday. She states she has been experincing vaginal itching and is certain she has a yeast infection.     Patient is asking if these prescriptions can be sent in today as she is leaving tomorrow morning. Patient states she is aware the refill of percocet will be early, but would run out of the medication while she is out of town.     Patient is also asking for a refill of the lidocaine 5% ointment (this was initially prescribed by Dr. Oscar). She states this is used for the foot wound as the dressing changes can be painful.     [facility].   
Tactile Stimulation

## 2023-01-24 ENCOUNTER — NON-APPOINTMENT (OUTPATIENT)
Age: 38
End: 2023-01-24

## 2023-01-28 ENCOUNTER — NON-APPOINTMENT (OUTPATIENT)
Age: 38
End: 2023-01-28

## 2023-02-14 NOTE — OB RN DELIVERY SUMMARY - NS_FORCEPSATTEMPT_OBGYN_ALL_OB
Called and spoke to patient's spouse.     He confirms that Danielle is requesting refill of topical Voltaren gel.   Patient was previously evaluated on 1/11/23 regarding her bilateral knee pain.    Please sign pending refill.     Catie Larsen, ATC  
Forceps were not used

## 2023-02-24 ENCOUNTER — NON-APPOINTMENT (OUTPATIENT)
Age: 38
End: 2023-02-24

## 2023-03-01 PROBLEM — U07.1 COVID-19: Chronic | Status: ACTIVE | Noted: 2022-09-29

## 2023-03-06 ENCOUNTER — APPOINTMENT (OUTPATIENT)
Dept: ORTHOPEDIC SURGERY | Facility: CLINIC | Age: 38
End: 2023-03-06
Payer: COMMERCIAL

## 2023-03-06 ENCOUNTER — APPOINTMENT (OUTPATIENT)
Dept: ORTHOPEDIC SURGERY | Facility: CLINIC | Age: 38
End: 2023-03-06

## 2023-03-20 ENCOUNTER — APPOINTMENT (OUTPATIENT)
Dept: ORTHOPEDIC SURGERY | Facility: CLINIC | Age: 38
End: 2023-03-20
Payer: COMMERCIAL

## 2023-03-20 DIAGNOSIS — R29.898 OTHER SYMPTOMS AND SIGNS INVOLVING THE MUSCULOSKELETAL SYSTEM: ICD-10-CM

## 2023-03-20 PROCEDURE — 73610 X-RAY EXAM OF ANKLE: CPT | Mod: LT

## 2023-03-20 PROCEDURE — 99214 OFFICE O/P EST MOD 30 MIN: CPT

## 2023-03-20 NOTE — HISTORY OF PRESENT ILLNESS
[de-identified] : Pt is a 37 year old F who presents today for evaluation of her LT ankle. Reports twisting injury July 2022. She was evaluated by Dr. Catalan and diagnosed with sprain. She did a course of PT and acupuncture. Symptoms improved but never completely resolved. WB in sneakers today. No new trauma/injury but has been experiencing more pain recently. No previous injury/problem with LT ankle.  [] : Post Surgical Visit: no [FreeTextEntry1] : L ankle

## 2023-03-20 NOTE — ASSESSMENT
[FreeTextEntry1] : Due to the chronicity, recommend MRI LT ankle to evaluate for peroneal tendon partial tear.\par \par WB in supportive footwear, ice to affected area.\par NSAIDS prn.\par \par Recommend return to PT to address ankle weakness.

## 2023-03-20 NOTE — PHYSICAL EXAM
[NL (40)] : plantar flexion 40 degrees [NL 30)] : inversion 30 degrees [4___] : eversion 4[unfilled]/5 [5___] : [facility] 5[unfilled]/5 [2+] : posterior tibialis pulse: 2+ [Normal] : saphenous nerve sensation normal [Left] : left ankle [Weight -] : weightbearing [There are no fractures, subluxations or dislocations. No significant abnormalities are seen] : There are no fractures, subluxations or dislocations. No significant abnormalities are seen [] : non-antalgic [de-identified] : eversion 15 degrees [TWNoteComboBox7] : dorsiflexion 15 degrees

## 2023-03-26 ENCOUNTER — FORM ENCOUNTER (OUTPATIENT)
Age: 38
End: 2023-03-26

## 2023-03-27 ENCOUNTER — APPOINTMENT (OUTPATIENT)
Dept: MRI IMAGING | Facility: CLINIC | Age: 38
End: 2023-03-27
Payer: COMMERCIAL

## 2023-03-27 PROCEDURE — 73721 MRI JNT OF LWR EXTRE W/O DYE: CPT | Mod: LT

## 2023-04-10 ENCOUNTER — APPOINTMENT (OUTPATIENT)
Dept: ORTHOPEDIC SURGERY | Facility: CLINIC | Age: 38
End: 2023-04-10

## 2023-05-01 ENCOUNTER — APPOINTMENT (OUTPATIENT)
Dept: ORTHOPEDIC SURGERY | Facility: CLINIC | Age: 38
End: 2023-05-01
Payer: COMMERCIAL

## 2023-05-01 PROCEDURE — 99214 OFFICE O/P EST MOD 30 MIN: CPT

## 2023-05-01 NOTE — DATA REVIEWED
[MRI] : MRI [Left] : left [Ankle] : ankle [Report was reviewed and noted in the chart] : The report was reviewed and noted in the chart [I independently reviewed and interpreted images and report] : I independently reviewed and interpreted images and report [I reviewed the films/CD and agree] : I reviewed the films/CD and agree [FreeTextEntry1] : 1. Partial tear of the anterior talofibular and calcaneofibular ligament with soft tissue edema. 2. Posterior tibial tendinopathy with tenosynovitis and insertional fraying. 3. Peroneal tendinopathy with tenosynovitis. 4. Tibiotalar joint effusion.

## 2023-05-01 NOTE — PHYSICAL EXAM
[NL (40)] : plantar flexion 40 degrees [NL 30)] : inversion 30 degrees [4___] : eversion 4[unfilled]/5 [2+] : posterior tibialis pulse: 2+ [Normal] : saphenous nerve sensation normal [Left] : left ankle [Weight -] : weightbearing [There are no fractures, subluxations or dislocations. No significant abnormalities are seen] : There are no fractures, subluxations or dislocations. No significant abnormalities are seen [NL (20)] : eversion 20 degrees [5___] : plantar flexion 5[unfilled]/5 [] : non-antalgic [de-identified] : eversion 15 degrees [TWNoteComboBox7] : dorsiflexion 15 degrees

## 2023-05-01 NOTE — HISTORY OF PRESENT ILLNESS
[8] : 8 [5] : 5 [Throbbing] : throbbing [de-identified] : Patient returns for her LT ankle. Reports twisting injury July 2022. She was evaluated by Dr. Catalan and diagnosed with sprain. She did a course of PT and acupuncture. Symptoms improved but never completely resolved.  She has not been to PT since last visit.  MRI showed no tendon tears. [] : Post Surgical Visit: no [FreeTextEntry1] : L ankle

## 2023-06-05 ENCOUNTER — APPOINTMENT (OUTPATIENT)
Dept: ORTHOPEDIC SURGERY | Facility: CLINIC | Age: 38
End: 2023-06-05

## 2023-08-03 ENCOUNTER — OUTPATIENT (OUTPATIENT)
Dept: OUTPATIENT SERVICES | Facility: HOSPITAL | Age: 38
LOS: 1 days | End: 2023-08-03

## 2023-08-03 VITALS
HEART RATE: 87 BPM | DIASTOLIC BLOOD PRESSURE: 86 MMHG | OXYGEN SATURATION: 100 % | SYSTOLIC BLOOD PRESSURE: 130 MMHG | HEIGHT: 62 IN | RESPIRATION RATE: 16 BRPM | TEMPERATURE: 99 F | WEIGHT: 212.97 LBS

## 2023-08-03 DIAGNOSIS — Z30.2 ENCOUNTER FOR STERILIZATION: ICD-10-CM

## 2023-08-03 DIAGNOSIS — Z30.09 ENCOUNTER FOR OTHER GENERAL COUNSELING AND ADVICE ON CONTRACEPTION: ICD-10-CM

## 2023-08-03 LAB
BLD GP AB SCN SERPL QL: NEGATIVE — SIGNIFICANT CHANGE UP
HCG UR QL: NEGATIVE — SIGNIFICANT CHANGE UP
HCT VFR BLD CALC: 40 % — SIGNIFICANT CHANGE UP (ref 34.5–45)
HGB BLD-MCNC: 13 G/DL — SIGNIFICANT CHANGE UP (ref 11.5–15.5)
MCHC RBC-ENTMCNC: 27.3 PG — SIGNIFICANT CHANGE UP (ref 27–34)
MCHC RBC-ENTMCNC: 32.5 GM/DL — SIGNIFICANT CHANGE UP (ref 32–36)
MCV RBC AUTO: 84 FL — SIGNIFICANT CHANGE UP (ref 80–100)
NRBC # BLD: 0 /100 WBCS — SIGNIFICANT CHANGE UP (ref 0–0)
NRBC # FLD: 0 K/UL — SIGNIFICANT CHANGE UP (ref 0–0)
PLATELET # BLD AUTO: 416 K/UL — HIGH (ref 150–400)
RBC # BLD: 4.76 M/UL — SIGNIFICANT CHANGE UP (ref 3.8–5.2)
RBC # FLD: 13.2 % — SIGNIFICANT CHANGE UP (ref 10.3–14.5)
RH IG SCN BLD-IMP: POSITIVE — SIGNIFICANT CHANGE UP
WBC # BLD: 10.11 K/UL — SIGNIFICANT CHANGE UP (ref 3.8–10.5)
WBC # FLD AUTO: 10.11 K/UL — SIGNIFICANT CHANGE UP (ref 3.8–10.5)

## 2023-08-03 RX ORDER — SODIUM CHLORIDE 9 MG/ML
1000 INJECTION, SOLUTION INTRAVENOUS
Refills: 0 | Status: DISCONTINUED | OUTPATIENT
Start: 2023-08-17 | End: 2023-08-31

## 2023-08-03 NOTE — H&P PST ADULT - ATTENDING COMMENTS
I consented the patient for the proposed procedure including a pelvic exam under anesthesia by myself and assistants in the room.

## 2023-08-03 NOTE — H&P PST ADULT - NSICDXPASTMEDICALHX_GEN_ALL_CORE_FT
PAST MEDICAL HISTORY:  2019 novel coronavirus disease (COVID-19)     Hypothyroidism in pregnancy      (normal spontaneous vaginal delivery) 10/21/2020 - male 7#2     PAST MEDICAL HISTORY:  2019 novel coronavirus disease (COVID-19)     Hypothyroidism in pregnancy      (normal spontaneous vaginal delivery) 10/21/2020 - male 7#2    Obese

## 2023-08-03 NOTE — H&P PST ADULT - MAMMOGRAM, RESULTS OF LAST, PROFILE
"they're monitoring me because they found something that's benign, so I go to [facility] every 6 months"

## 2023-08-03 NOTE — H&P PST ADULT - PROBLEM SELECTOR PLAN 1
Pt. is scheduled for a laparoscopic ROBERT salpingectomy...8/17/23.  Pt. verbalized understanding of instructions and that Chlorhexidine is for external use.

## 2023-08-03 NOTE — H&P PST ADULT - NSICDXFAMILYHX_GEN_ALL_CORE_FT
FAMILY HISTORY:  Father  Still living? Yes, Estimated age: Age Unknown  FH: HTN (hypertension), Age at diagnosis: Age Unknown  FH: hyperlipidemia, Age at diagnosis: Age Unknown

## 2023-08-16 ENCOUNTER — TRANSCRIPTION ENCOUNTER (OUTPATIENT)
Age: 38
End: 2023-08-16

## 2023-08-17 ENCOUNTER — OUTPATIENT (OUTPATIENT)
Dept: OUTPATIENT SERVICES | Facility: HOSPITAL | Age: 38
LOS: 1 days | Discharge: HOME | End: 2023-08-17
Payer: COMMERCIAL

## 2023-08-17 ENCOUNTER — TRANSCRIPTION ENCOUNTER (OUTPATIENT)
Age: 38
End: 2023-08-17

## 2023-08-17 VITALS
OXYGEN SATURATION: 98 % | DIASTOLIC BLOOD PRESSURE: 72 MMHG | HEART RATE: 84 BPM | SYSTOLIC BLOOD PRESSURE: 115 MMHG | TEMPERATURE: 98 F | RESPIRATION RATE: 17 BRPM

## 2023-08-17 VITALS
SYSTOLIC BLOOD PRESSURE: 125 MMHG | RESPIRATION RATE: 16 BRPM | HEIGHT: 62 IN | TEMPERATURE: 98 F | HEART RATE: 79 BPM | WEIGHT: 212.97 LBS | OXYGEN SATURATION: 99 %

## 2023-08-17 DIAGNOSIS — Z30.09 ENCOUNTER FOR OTHER GENERAL COUNSELING AND ADVICE ON CONTRACEPTION: ICD-10-CM

## 2023-08-17 LAB — HCG UR QL: NEGATIVE — SIGNIFICANT CHANGE UP

## 2023-08-17 PROCEDURE — 88302 TISSUE EXAM BY PATHOLOGIST: CPT | Mod: 26

## 2023-08-17 RX ORDER — OXYCODONE HYDROCHLORIDE 5 MG/1
5 TABLET ORAL ONCE
Refills: 0 | Status: DISCONTINUED | OUTPATIENT
Start: 2023-08-17 | End: 2023-08-17

## 2023-08-17 RX ADMIN — OXYCODONE HYDROCHLORIDE 5 MILLIGRAM(S): 5 TABLET ORAL at 17:40

## 2023-08-17 RX ADMIN — OXYCODONE HYDROCHLORIDE 5 MILLIGRAM(S): 5 TABLET ORAL at 17:10

## 2023-08-17 NOTE — ASU DISCHARGE PLAN (ADULT/PEDIATRIC) - NURSING INSTRUCTIONS
You received IV Tylenol for pain management at 3:30 PM. Please DO NOT take any Tylenol (Acetaminophen) containing products, such as Vicodin, Percocet, Excedrin, and cold medications for the next 6 hours (until 9:30 PM). DO NOT TAKE MORE THAN 3000 MG OF TYLENOL in a 24 hour period.     You received IV Toradol for pain management at 4 PM. Please DO NOT take Motrin/Ibuprofen/Advil/Aleve/NSAIDs (Non-Steroidal Anti-Inflammatory Drugs) for the next 6 hours (until 10 PM).

## 2023-08-17 NOTE — ASU DISCHARGE PLAN (ADULT/PEDIATRIC) - CARE PROVIDER_API CALL
Oralia Michel  Obstetrics and Gynecology  [address], [address]  [place] [address]  Phone: [phone]  Fax: [phone]  Follow Up Time:

## 2023-08-17 NOTE — ASU DISCHARGE PLAN (ADULT/PEDIATRIC) - NS MD DC FALL RISK RISK
For information on Fall & Injury Prevention, visit: [email or web address] OR  [email or web address] OR  https://www.cdc.gov/steadi/patient.html

## 2023-08-17 NOTE — CHART NOTE - NSCHARTNOTEFT_GEN_A_CORE
Patient seen and examined at bedside, recently post-op. She is complaining of abdominal pain that improved with oxycodone and is now well controlled. Denies CP, SOB, N/V, fevers, and chills. She voided spontaneously at 6p.     Vital Signs Last 24 Hours  T(C): 36.4 (08-17-23 @ 17:00), Max: 36.8 (08-17-23 @ 11:35)  HR: 82 (08-17-23 @ 17:45) (72 - 84)  BP: 116/73 (08-17-23 @ 17:45) (114/70 - 125/-)  RR: 18 (08-17-23 @ 17:45) (16 - 19)  SpO2: 99% (08-17-23 @ 17:45) (98% - 99%)    I&O's Summary      Physical Exam:  General: NAD  CV: RRR  Lungs: CTA-B  Abdomen: Soft, appropriately tender, non-distended, +BS  Incision: one umbilical incision, CDI  Ext: No pain or swelling    Labs:      MEDICATIONS  (STANDING):  lactated ringers. 1000 milliLiter(s) (30 mL/Hr) IV Continuous <Continuous>    MEDICATIONS  (PRN):      39 yo s/p laparoscopic bilateral salpingectomy recovering well in acute post-operative state.    Neuro: Pain controlled. PO pain meds.   CV: Hemodynamically stable  Pulm: Encourage oob/ambulation, incentive spirometer at bedside  GI: Advance diet as tolerated  : s/p void at 6p  Heme: SCDs for DVT PPX  ID: afebrile  Endo: no active issues  Dispo: d/c home    Soraya Fagan PGY-1  d/w GYN team

## 2023-08-17 NOTE — ASU DISCHARGE PLAN (ADULT/PEDIATRIC) - ASU DC SPECIAL INSTRUCTIONSFT
Discharge Instructions:  1. Diet: advance as tolerated  2. Activity limited by:   - no running, heavy lifting, strenuous exercise until cleared by MD   - nothing in vagina (bath, swim, intercourse, douching, tampons) until cleared by MD   3. Medications:   - Ibuprofen 600mg every 6 hours as needed for pain  - Acetaminophen 975mg every 6 hours as needed for pain  - Oxycodone 5mg every 6 hours as needed for severe pain  4. Follow-up appointment - 2 weeks. Please call the office upon discharge to schedule your appointment if you do not have one already.   5. Precautions:  - Call the office or go to the ED if you have any of the followin) Fever >100.4 that does not resolve  2) Intractable pain  3) Heavy bleeding

## 2023-08-29 NOTE — OB PROVIDER H&P - NS_SONONOTE_OBGYN_ALL_OB_FT
- - -
Intermediate Repair Preamble Text (Leave Blank If You Do Not Want): Undermining was performed with blunt dissection.
bpp 10/10

## 2023-09-06 LAB — SURGICAL PATHOLOGY STUDY: SIGNIFICANT CHANGE UP

## 2023-09-18 PROBLEM — E66.9 OBESITY, UNSPECIFIED: Chronic | Status: ACTIVE | Noted: 2023-08-03

## 2023-09-18 PROBLEM — O99.280 ENDOCRINE, NUTRITIONAL AND METABOLIC DISEASES COMPLICATING PREGNANCY, UNSPECIFIED TRIMESTER: Chronic | Status: ACTIVE | Noted: 2023-08-03

## 2023-10-09 ENCOUNTER — APPOINTMENT (OUTPATIENT)
Dept: ORTHOPEDIC SURGERY | Facility: CLINIC | Age: 38
End: 2023-10-09
Payer: COMMERCIAL

## 2023-10-09 DIAGNOSIS — M76.72 PERONEAL TENDINITIS, LEFT LEG: ICD-10-CM

## 2023-10-09 PROCEDURE — 99213 OFFICE O/P EST LOW 20 MIN: CPT

## 2023-11-27 ENCOUNTER — APPOINTMENT (OUTPATIENT)
Dept: ORTHOPEDIC SURGERY | Facility: CLINIC | Age: 38
End: 2023-11-27
Payer: COMMERCIAL

## 2023-11-27 VITALS — HEIGHT: 62 IN | BODY MASS INDEX: 38.64 KG/M2 | WEIGHT: 210 LBS

## 2023-11-27 PROCEDURE — 73110 X-RAY EXAM OF WRIST: CPT | Mod: RT

## 2023-11-27 PROCEDURE — 99213 OFFICE O/P EST LOW 20 MIN: CPT

## 2023-11-27 RX ORDER — METHYLPREDNISOLONE 4 MG/1
4 TABLET ORAL
Qty: 1 | Refills: 0 | Status: ACTIVE | COMMUNITY
Start: 2023-11-27 | End: 1900-01-01

## 2023-12-18 ENCOUNTER — APPOINTMENT (OUTPATIENT)
Dept: ORTHOPEDIC SURGERY | Facility: CLINIC | Age: 38
End: 2023-12-18
Payer: COMMERCIAL

## 2023-12-18 VITALS — BODY MASS INDEX: 38.64 KG/M2 | HEIGHT: 62 IN | WEIGHT: 210 LBS

## 2023-12-18 DIAGNOSIS — M18.11 UNILATERAL PRIMARY OSTEOARTHRITIS OF FIRST CARPOMETACARPAL JOINT, RIGHT HAND: ICD-10-CM

## 2023-12-18 DIAGNOSIS — M18.12 UNILATERAL PRIMARY OSTEOARTHRITIS OF FIRST CARPOMETACARPAL JOINT, LEFT HAND: ICD-10-CM

## 2023-12-18 PROCEDURE — 99213 OFFICE O/P EST LOW 20 MIN: CPT

## 2023-12-18 NOTE — HISTORY OF PRESENT ILLNESS
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  Has been using NSAIDs, warm compresses and splinting.  She reports slight improvement, but still with symptoms.  She complains of LEFT hand pain at this time [FreeTextEntry1] : R hand

## 2023-12-18 NOTE — DISCUSSION/SUMMARY
[de-identified] : Discussed the nature of the diagnosis and risk and benefits of different modalities of treatment. Discussed continued conservative management vs CSI for CMC.  She would like to continue with conservative management.  RTO 4 weeks.

## 2023-12-20 ENCOUNTER — NON-APPOINTMENT (OUTPATIENT)
Age: 38
End: 2023-12-20

## 2024-01-22 ENCOUNTER — APPOINTMENT (OUTPATIENT)
Dept: ORTHOPEDIC SURGERY | Facility: CLINIC | Age: 39
End: 2024-01-22

## 2024-02-22 NOTE — OB PROVIDER DELIVERY SUMMARY - AMNIOTIC FLUID ODOR, LABOR
Detail Level: Detailed
Quality 226: Preventive Care And Screening: Tobacco Use: Screening And Cessation Intervention: Patient screened for tobacco use and is an ex/non-smoker
normal

## 2024-04-12 ENCOUNTER — APPOINTMENT (OUTPATIENT)
Dept: BARIATRICS/WEIGHT MGMT | Facility: CLINIC | Age: 39
End: 2024-04-12

## 2024-10-30 ENCOUNTER — APPOINTMENT (OUTPATIENT)
Dept: ORTHOPEDIC SURGERY | Facility: CLINIC | Age: 39
End: 2024-10-30
Payer: COMMERCIAL

## 2024-10-30 DIAGNOSIS — M54.2 CERVICALGIA: ICD-10-CM

## 2024-10-30 DIAGNOSIS — Z00.00 ENCOUNTER FOR GENERAL ADULT MEDICAL EXAMINATION W/OUT ABNORMAL FINDINGS: ICD-10-CM

## 2024-10-30 DIAGNOSIS — S93.401A SPRAIN OF UNSPECIFIED LIGAMENT OF RIGHT ANKLE, INITIAL ENCOUNTER: ICD-10-CM

## 2024-10-30 PROCEDURE — 73600 X-RAY EXAM OF ANKLE: CPT | Mod: RT

## 2024-10-30 PROCEDURE — 99214 OFFICE O/P EST MOD 30 MIN: CPT | Mod: 25

## 2024-10-30 PROCEDURE — 73630 X-RAY EXAM OF FOOT: CPT | Mod: RT

## 2024-10-30 PROCEDURE — L4361: CPT | Mod: RT

## 2024-10-30 RX ORDER — DICLOFENAC SODIUM 75 MG/1
75 TABLET, DELAYED RELEASE ORAL
Qty: 60 | Refills: 1 | Status: ACTIVE | COMMUNITY
Start: 2024-10-30 | End: 1900-01-01

## 2024-11-13 ENCOUNTER — APPOINTMENT (OUTPATIENT)
Dept: ORTHOPEDIC SURGERY | Facility: CLINIC | Age: 39
End: 2024-11-13

## 2024-12-11 NOTE — DISCHARGE NOTE OB - FUNCTIONAL SCREEN CURRENT LEVEL: DRESSING, MLM
Last office visit 12/4/2024     Last written 11/26/24     Next office visit scheduled 1/15/2025    Requested Prescriptions     Pending Prescriptions Disp Refills    predniSONE (DELTASONE) 20 MG tablet [Pharmacy Med Name: predniSONE 20 MG TABLET] 10 tablet 0     Sig: TAKE 1 TABLET BY MOUTH TWICE A DAY FOR 5 DAYS    lidocaine (LIDODERM) 5 % [Pharmacy Med Name: LIDOCAINE 5% PATCH] 10 patch 0     Sig: APPLY 1 PATCH TO AFFECTED AREA FOR 12 HOURS IN A 24 HOUR PERIOD              0 = independent 2 = assistive person

## 2025-02-28 NOTE — H&P PST ADULT - CONSTITUTIONAL
Subjective   History was provided by the mother.  Sienna Bolden is a 13 y.o. female who is here for this well-child visit.    Current Issues:  Current concerns include none.  Currently menstruating?  Regular for 1 yr  Sleep: all night  Sleep hygiene    Review of Nutrition:  Current diet: healthy  Elimination patterns/Constipation? No    Social Screening:     Discipline concerns? no  Concerns regarding behavior with peers? no  School performance: good  Grade level  6, public  IEP/504 plan  no  Extracurricular activities  basketball, volleyball and softball  PHQ/ASQ completed and reviewed. Risk Factors No    Physical Exam    Gen: Patient is alert and in NAD.   HEENT: Head is NC/AT. PERRL. EOMI. No conjunctival injection present. Fundi are NL; no esotropia or exotropia. TMs are transparent with good landmarks. Nasopharynx is without significant edema or rhinorrhea. Oropharynx is clear with MMM.   No tonsillar enlargement or exudates present. Good dentition.  Neck: supple; no lymphadenopathy or masses.  CV: RRR, NL S1/S2, no murmurs.    Resp: CTA bilaterally; no wheezes or rhonchi; work of breathing is NL.    Abdomen: soft, non-tender, non-distended; no HSM or masses; positive bowel sounds.   : NL female  genitalia, Yuriy stage 3*.  No hernias  Musculoskeletal: Spine is straight; extremities are warm and dry with full ROM.     Neuro: NL gait, muscle tone, strength, and DTRs.     Skin: No significant rashes or lesions.        Assessment & Plan  Encounter for routine child health examination without abnormal findings    Orders:    Follow Up In Pediatrics - Health Maintenance    1 Year Follow Up In Pediatrics; Future    Screening for depression       normal  Need for vaccination    Orders:    HPV 9-valent vaccine (GARDASIL 9)             Growth/Growth Charts, Nutrition, puberty, school performance, peer relationships, and age appropriate safety discussed  Counseled on age appropriate exercise daily  Avoid excessive  portions and sugary beverages, focus on fresh unprocessed foods.  Sports/camp forms can be filled out based on today's exam and are good for one year.  Sun safety, car safety, and dental care reviewed  Vision screen completed by eye      Influenza vaccine recommended every fall      obese

## (undated) DEVICE — LIGASURE MARYLAND 37CM

## (undated) DEVICE — TROCAR COVIDIEN VERSAONE BLADED FIXATION 11MM STANDARD

## (undated) DEVICE — UTERINE MANIPULATOR COOPER SURGICAL 5MM 33CM GREEN

## (undated) DEVICE — TROCAR COVIDIEN VERSAONE BLUNT TIP HASSAN 12MM

## (undated) DEVICE — SOL IRR POUR NS 0.9% 500ML

## (undated) DEVICE — FOLEY CATH 2-WAY 16FR 5CC LATEX LUBRICATH

## (undated) DEVICE — PACK D&C

## (undated) DEVICE — ENDOCATCH 10MM SPECIMEN POUCH

## (undated) DEVICE — GLV 7 PROTEXIS (CREAM) MICRO

## (undated) DEVICE — GLV 6.5 PROTEXIS (CREAM) MICRO

## (undated) DEVICE — LABELS BLANK W PEN

## (undated) DEVICE — LIGASURE BLUNT TIP 37CM

## (undated) DEVICE — PREP BETADINE SPONGE STICKS

## (undated) DEVICE — INSUFFLATION NDL COVIDIEN SURGINEEDLE VERESS 120MM

## (undated) DEVICE — ELCTR GROUNDING PAD ADULT COVIDIEN

## (undated) DEVICE — DRSG KERLIX MED 6"

## (undated) DEVICE — SYR LUER LOK 50CC

## (undated) DEVICE — POSITIONER STRAP ARMBOARD VELCRO TS-30

## (undated) DEVICE — TUBING TRUWAVE PRESSURE MALE/FEMALE 12"

## (undated) DEVICE — WARMING BLANKET FULL ADULT

## (undated) DEVICE — TROCAR GELPOINT MINI ADVANCED

## (undated) DEVICE — TUBING HYDRO-SURG PLUS IRRIGATOR W SMOKEVAC & PROBE

## (undated) DEVICE — SYR ASEPTO

## (undated) DEVICE — DRSG TEGADERM 1.75X1.75"

## (undated) DEVICE — SOL IRR POUR H2O 500ML

## (undated) DEVICE — SCOPE WARMER SEAL DISP

## (undated) DEVICE — TROCAR ETHICON ENDOPATH XCEL BLADELESS 5MM X 100MM STABILITY

## (undated) DEVICE — PACK GENERAL LAPAROSCOPY

## (undated) DEVICE — TIP METZENBAUM SCISSOR MONOPOLAR ENDOCUT (ORANGE)

## (undated) DEVICE — SUT MONOCRYL 4-0 27" PS-2 UNDYED

## (undated) DEVICE — PROTECTOR HEEL / ELBOW FLUFFY

## (undated) DEVICE — TRAP SPECIMEN SPUTUM 40CC

## (undated) DEVICE — FOLEY TRAY 16FR 5CC LF UMETER CLOSED

## (undated) DEVICE — TUBING OLYMPUS INSUFFLATION

## (undated) DEVICE — SUT VICRYL 0 27" UR-6

## (undated) DEVICE — GOWN XL

## (undated) DEVICE — VENODYNE/SCD SLEEVE CALF MEDIUM

## (undated) DEVICE — BASIN SET SINGLE

## (undated) DEVICE — POSITIONER FOAM EGGCRATE PADS 20 X 72 X 2"

## (undated) DEVICE — BLADE SURGICAL #15 CARBON

## (undated) DEVICE — UTERINE MANIPULATOR CLINICAL INNOVATIONS CLEARVIEW 7CM